# Patient Record
Sex: FEMALE | Race: WHITE | NOT HISPANIC OR LATINO | Employment: UNEMPLOYED | URBAN - METROPOLITAN AREA
[De-identification: names, ages, dates, MRNs, and addresses within clinical notes are randomized per-mention and may not be internally consistent; named-entity substitution may affect disease eponyms.]

---

## 2019-11-11 ENCOUNTER — APPOINTMENT (OUTPATIENT)
Dept: WOMENS IMAGING | Facility: HOSPITAL | Age: 49
End: 2019-11-11

## 2019-11-11 PROCEDURE — 77067 SCR MAMMO BI INCL CAD: CPT | Performed by: RADIOLOGY

## 2021-03-05 ENCOUNTER — APPOINTMENT (OUTPATIENT)
Dept: WOMENS IMAGING | Facility: HOSPITAL | Age: 51
End: 2021-03-05

## 2021-03-05 PROCEDURE — 77067 SCR MAMMO BI INCL CAD: CPT | Performed by: RADIOLOGY

## 2022-08-11 RX ORDER — ESCITALOPRAM OXALATE 10 MG/1
TABLET ORAL
Qty: 90 TABLET | Refills: 0 | Status: SHIPPED | OUTPATIENT
Start: 2022-08-11 | End: 2022-11-16

## 2022-08-11 RX ORDER — MELOXICAM 7.5 MG/1
TABLET ORAL
Qty: 30 TABLET | Refills: 0 | Status: SHIPPED | OUTPATIENT
Start: 2022-08-11 | End: 2022-09-13 | Stop reason: SDUPTHER

## 2022-09-13 RX ORDER — MELOXICAM 7.5 MG/1
7.5 TABLET ORAL DAILY
Qty: 30 TABLET | Refills: 0 | Status: SHIPPED | OUTPATIENT
Start: 2022-09-13 | End: 2022-10-18 | Stop reason: SDUPTHER

## 2022-09-13 NOTE — TELEPHONE ENCOUNTER
Caller: Allison Jim    Relationship: Self    Best call back number: [phone]    Requested Prescriptions:   Requested Prescriptions     Pending Prescriptions Disp Refills   • meloxicam (MOBIC) 7.5 MG tablet 30 tablet 0     Sig: Take 1 tablet by mouth Daily.        Pharmacy where request should be sent:[facility]      Additional details provided by patient: PATIENT HAS 2 PILLS LEFT    Does the patient have less than a 3 day supply:  [x] Yes  [] No    Kathia Adam Rep   09/13/22 09:15 EDT

## 2022-10-18 ENCOUNTER — TELEPHONE (OUTPATIENT)
Dept: FAMILY MEDICINE CLINIC | Facility: CLINIC | Age: 52
End: 2022-10-18

## 2022-10-18 RX ORDER — MELOXICAM 7.5 MG/1
7.5 TABLET ORAL DAILY
Qty: 30 TABLET | Refills: 0 | Status: SHIPPED | OUTPATIENT
Start: 2022-10-18 | End: 2022-10-27

## 2022-10-18 RX ORDER — VALACYCLOVIR HYDROCHLORIDE 1 G/1
1000 TABLET, FILM COATED ORAL AS NEEDED
COMMUNITY
Start: 2022-09-26

## 2022-10-18 RX ORDER — NITROFURANTOIN 25; 75 MG/1; MG/1
CAPSULE ORAL
COMMUNITY
Start: 2022-10-04

## 2022-10-18 RX ORDER — ESTRADIOL 0.1 MG/G
CREAM VAGINAL
COMMUNITY
Start: 2022-08-01

## 2022-10-18 RX ORDER — ATORVASTATIN CALCIUM 20 MG/1
20 TABLET, FILM COATED ORAL DAILY
COMMUNITY
Start: 2022-10-04 | End: 2023-01-12

## 2022-10-18 NOTE — TELEPHONE ENCOUNTER
Caller: Max Jima    Relationship: Self    Best call back number:     [phone]       Requested Prescriptions:   Requested Prescriptions     Pending Prescriptions Disp Refills   • meloxicam (MOBIC) 7.5 MG tablet 30 tablet 0     Sig: Take 1 tablet by mouth Daily.        Pharmacy where request should be sent: [facility] [address] - [phone] [facility] [phone] [facility]     Additional details provided by patient:  PATIENT IS OUT OF THE MEDICATION     Does the patient have less than a 3 day supply:  [x] Yes  [] No

## 2022-10-27 ENCOUNTER — OFFICE VISIT (OUTPATIENT)
Dept: FAMILY MEDICINE CLINIC | Facility: CLINIC | Age: 52
End: 2022-10-27

## 2022-10-27 VITALS
HEIGHT: 61 IN | WEIGHT: 122.5 LBS | OXYGEN SATURATION: 97 % | DIASTOLIC BLOOD PRESSURE: 82 MMHG | SYSTOLIC BLOOD PRESSURE: 114 MMHG | BODY MASS INDEX: 23.13 KG/M2 | HEART RATE: 75 BPM

## 2022-10-27 DIAGNOSIS — Z79.899 ENCOUNTER FOR LONG-TERM (CURRENT) USE OF OTHER MEDICATIONS: ICD-10-CM

## 2022-10-27 DIAGNOSIS — Z23 NEEDS FLU SHOT: ICD-10-CM

## 2022-10-27 DIAGNOSIS — H93.8X3 EAR FULLNESS, BILATERAL: ICD-10-CM

## 2022-10-27 DIAGNOSIS — J30.2 SEASONAL ALLERGIC RHINITIS, UNSPECIFIED TRIGGER: ICD-10-CM

## 2022-10-27 DIAGNOSIS — M25.50 ARTHRALGIA, UNSPECIFIED JOINT: Primary | ICD-10-CM

## 2022-10-27 PROBLEM — J30.9 ALLERGIC RHINITIS: Status: ACTIVE | Noted: 2022-10-27

## 2022-10-27 PROCEDURE — 99214 OFFICE O/P EST MOD 30 MIN: CPT | Performed by: NURSE PRACTITIONER

## 2022-10-27 PROCEDURE — 90471 IMMUNIZATION ADMIN: CPT | Performed by: NURSE PRACTITIONER

## 2022-10-27 PROCEDURE — 90686 IIV4 VACC NO PRSV 0.5 ML IM: CPT | Performed by: NURSE PRACTITIONER

## 2022-10-27 RX ORDER — PREDNISONE 20 MG/1
TABLET ORAL
Qty: 18 TABLET | Refills: 0 | Status: SHIPPED | OUTPATIENT
Start: 2022-10-27 | End: 2022-11-05

## 2022-10-27 RX ORDER — MELOXICAM 15 MG/1
15 TABLET ORAL DAILY
Qty: 90 TABLET | Refills: 1 | Status: SHIPPED | OUTPATIENT
Start: 2022-10-27

## 2022-10-27 NOTE — ASSESSMENT & PLAN NOTE
Flu shot given today.  Vaccine information sheet given.  Risk discussed understood.  Patient tolerated well.  Return to clinic or ED with any issues or concerns.

## 2022-10-27 NOTE — ASSESSMENT & PLAN NOTE
Continue with allergy meds and Flonase.  Prednisone should help with this.  Avoid NSAIDs while taking prednisone.  She knows not to take meloxicam while on prednisone.  Informed her that allergies are likely the cause of her ear fullness.  Bilateral tympanic membranes are bulging so hoping the prednisone will help this also.  Risk discussed understood.  Return in 1 week if no improvement, sooner if worsens.  Return to clinic or ED with any issues or concerns.

## 2022-10-27 NOTE — PROGRESS NOTES
"Chief Complaint  Med Refill    Subjective          Allison Jim presents to [facility]  History of Present Illness     Presents for refill of meloxicam which she takes for joint aches and pains.  States it is working well but feels she would benefit from an increased dose.  No redness no warmth no swelling.    States she also has allergies and takes an allergy pill and Flonase daily.  States for the past year she has been dealing with bilateral ear fullness and pressure.  States the Flonase and allergy medicine does not seem to be helping it.  No pain no discharge.  No fever no chills no body aches.    States she has a follow-up with her gynecologist Dr. Perez in December and states they keep up with Pap smears and mammograms.  Colonoscopy up-to-date.  She would like a flu shot today.  States she will discuss further immunizations such as pneumonia tetanus and COVID vaccines with her pharmacist.    Objective   Vital Signs:   /82   Pulse 75   Ht 154.9 cm (61\")   Wt 55.6 kg (122 lb 8 oz)   SpO2 97%   BMI 23.15 kg/m²     Body mass index is 23.15 kg/m².    Review of Systems   Constitutional: Negative for chills, fatigue and fever.   HENT: Positive for postnasal drip and rhinorrhea. Negative for congestion, ear discharge, sinus pressure, sneezing, sore throat and trouble swallowing.    Respiratory: Negative for cough, shortness of breath and wheezing.    Cardiovascular: Negative for chest pain and palpitations.   Gastrointestinal: Negative for abdominal pain, blood in stool, constipation, diarrhea, nausea and vomiting.   Genitourinary: Negative for dysuria.   Musculoskeletal: Positive for arthralgias.   Neurological: Negative for dizziness and headache.   Psychiatric/Behavioral: Negative for suicidal ideas.       Past History:  Medical History: has no past medical history on file.   Surgical History: has a past surgical history that includes Tubal ligation.   Family History: " family history includes Cancer in her father; Diabetes in her mother; Hypertension in her mother.   Social History: reports that she quit smoking about 3 years ago. Her smoking use included cigarettes. She has a 7.50 pack-year smoking history. She has never used smokeless tobacco.    PHQ-2 Depression Screening  Little interest or pleasure in doing things? 0-->not at all   Feeling down, depressed, or hopeless? 0-->not at all   PHQ-2 Total Score 0        PHQ-9 Depression Screening  Little interest or pleasure in doing things? 0-->not at all   Feeling down, depressed, or hopeless? 0-->not at all   Trouble falling or staying asleep, or sleeping too much?     Feeling tired or having little energy?     Poor appetite or overeating?     Feeling bad about yourself - or that you are a failure or have let yourself or your family down?     Trouble concentrating on things, such as reading the newspaper or watching television?     Moving or speaking so slowly that other people could have noticed? Or the opposite - being so fidgety or restless that you have been moving around a lot more than usual?     Thoughts that you would be better off dead, or of hurting yourself in some way?     PHQ-9 Total Score 0   If you checked off any problems, how difficult have these problems made it for you to do your work, take care of things at home, or get along with other people?       PHQ-9 Total Score: 0      Patient screened positive for depression based on a PHQ-9 score of 0 on 10/27/2022. Follow-up recommendations include:         Current Outpatient Medications:   •  atorvastatin (LIPITOR) 20 MG tablet, Take 1 tablet by mouth Daily., Disp: , Rfl:   •  escitalopram (LEXAPRO) 10 MG tablet, TAKE 1 TABLET BY MOUTH EVERY DAY, Disp: 90 tablet, Rfl: 0  •  estradiol (ESTRACE) 0.1 MG/GM vaginal cream, , Disp: , Rfl:   •  nitrofurantoin, macrocrystal-monohydrate, (MACROBID) 100 MG capsule, , Disp: , Rfl:   •  valACYclovir (VALTREX) 1000 MG tablet,  Take 1 tablet by mouth As Needed., Disp: , Rfl:   •  meloxicam (MOBIC) 15 MG tablet, Take 1 tablet by mouth Daily. DO NOT TAKE WHILE TAKING PREDNISONE, Disp: 90 tablet, Rfl: 1  •  predniSONE (DELTASONE) 20 MG tablet, Take 3 tablets by mouth Daily for 3 days, THEN 2 tablets Daily for 3 days, THEN 1 tablet Daily for 3 days., Disp: 18 tablet, Rfl: 0   (Not in a hospital admission)     Allergies: Patient has no known allergies.    Physical Exam  Constitutional:       Appearance: Normal appearance.   HENT:      Right Ear: Ear canal and external ear normal.      Left Ear: Ear canal and external ear normal.      Ears:      Comments: Bilateral bulging tympanic membranes.  No redness.     Nose: Rhinorrhea present.      Mouth/Throat:      Mouth: Mucous membranes are moist.      Pharynx: Oropharynx is clear.   Eyes:      Conjunctiva/sclera: Conjunctivae normal.      Pupils: Pupils are equal, round, and reactive to light.   Cardiovascular:      Rate and Rhythm: Normal rate and regular rhythm.      Heart sounds: Normal heart sounds.   Pulmonary:      Effort: Pulmonary effort is normal.      Breath sounds: Normal breath sounds.   Skin:     General: Skin is warm.      Findings: No erythema.   Neurological:      General: No focal deficit present.      Mental Status: She is alert and oriented to person, place, and time. Mental status is at baseline.   Psychiatric:         Mood and Affect: Mood normal.         Behavior: Behavior normal.         Thought Content: Thought content normal.         Judgment: Judgment normal.          Result Review :                   Assessment and Plan    Diagnoses and all orders for this visit:    1. Arthralgia, unspecified joint (Primary)  Assessment & Plan:  Labs drawn.  Will increase meloxicam.  She knows not to take the meloxicam while she is taking the prednisone.  Risk discussed understood.  Return to clinic or ED with any issues or concerns.    Orders:  -     meloxicam (MOBIC) 15 MG tablet; Take 1  tablet by mouth Daily. DO NOT TAKE WHILE TAKING PREDNISONE  Dispense: 90 tablet; Refill: 1    2. Seasonal allergic rhinitis, unspecified trigger  Assessment & Plan:  Continue with allergy meds and Flonase.  Prednisone should help with this.  Avoid NSAIDs while taking prednisone.  She knows not to take meloxicam while on prednisone.  Informed her that allergies are likely the cause of her ear fullness.  Bilateral tympanic membranes are bulging so hoping the prednisone will help this also.  Risk discussed understood.  Return in 1 week if no improvement, sooner if worsens.  Return to clinic or ED with any issues or concerns.    Orders:  -     predniSONE (DELTASONE) 20 MG tablet; Take 3 tablets by mouth Daily for 3 days, THEN 2 tablets Daily for 3 days, THEN 1 tablet Daily for 3 days.  Dispense: 18 tablet; Refill: 0    3. Ear fullness, bilateral  -     predniSONE (DELTASONE) 20 MG tablet; Take 3 tablets by mouth Daily for 3 days, THEN 2 tablets Daily for 3 days, THEN 1 tablet Daily for 3 days.  Dispense: 18 tablet; Refill: 0    4. Encounter for long-term (current) use of other medications  -     CBC & Differential; Future  -     Comprehensive Metabolic Panel; Future  -     CBC & Differential  -     Comprehensive Metabolic Panel    5. Needs flu shot  Assessment & Plan:  Flu shot given today.  Vaccine information sheet given.  Risk discussed understood.  Patient tolerated well.  Return to clinic or ED with any issues or concerns.              BMI is within normal parameters. No other follow-up for BMI required.       Follow Up   Return in about 6 months (around 4/27/2023).  Patient was given instructions and counseling regarding her condition or for health maintenance advice. Please see specific information pulled into the AVS if appropriate.     YOMI Oneal

## 2022-10-27 NOTE — ASSESSMENT & PLAN NOTE
Labs drawn.  Will increase meloxicam.  She knows not to take the meloxicam while she is taking the prednisone.  Risk discussed understood.  Return to clinic or ED with any issues or concerns.

## 2022-10-28 LAB
ALBUMIN SERPL-MCNC: 4.5 G/DL (ref 3.8–4.9)
ALBUMIN/GLOB SERPL: 1.9 {RATIO} (ref 1.2–2.2)
ALP SERPL-CCNC: 106 IU/L (ref 44–121)
ALT SERPL-CCNC: 18 IU/L (ref 0–32)
AST SERPL-CCNC: 23 IU/L (ref 0–40)
BASOPHILS # BLD AUTO: 0.1 X10E3/UL (ref 0–0.2)
BASOPHILS NFR BLD AUTO: 1 %
BILIRUB SERPL-MCNC: 0.3 MG/DL (ref 0–1.2)
BUN SERPL-MCNC: 8 MG/DL (ref 6–24)
BUN/CREAT SERPL: 11 (ref 9–23)
CALCIUM SERPL-MCNC: 9.9 MG/DL (ref 8.7–10.2)
CHLORIDE SERPL-SCNC: 105 MMOL/L (ref 96–106)
CO2 SERPL-SCNC: 22 MMOL/L (ref 20–29)
CREAT SERPL-MCNC: 0.75 MG/DL (ref 0.57–1)
EGFRCR SERPLBLD CKD-EPI 2021: 95 ML/MIN/1.73
EOSINOPHIL # BLD AUTO: 0.3 X10E3/UL (ref 0–0.4)
EOSINOPHIL NFR BLD AUTO: 4 %
ERYTHROCYTE [DISTWIDTH] IN BLOOD BY AUTOMATED COUNT: 12.6 % (ref 11.7–15.4)
GLOBULIN SER CALC-MCNC: 2.4 G/DL (ref 1.5–4.5)
GLUCOSE SERPL-MCNC: 109 MG/DL (ref 70–99)
HCT VFR BLD AUTO: 37.7 % (ref 34–46.6)
HGB BLD-MCNC: 12.5 G/DL (ref 11.1–15.9)
IMM GRANULOCYTES # BLD AUTO: 0 X10E3/UL (ref 0–0.1)
IMM GRANULOCYTES NFR BLD AUTO: 0 %
LYMPHOCYTES # BLD AUTO: 2.5 X10E3/UL (ref 0.7–3.1)
LYMPHOCYTES NFR BLD AUTO: 36 %
MCH RBC QN AUTO: 30.9 PG (ref 26.6–33)
MCHC RBC AUTO-ENTMCNC: 33.2 G/DL (ref 31.5–35.7)
MCV RBC AUTO: 93 FL (ref 79–97)
MONOCYTES # BLD AUTO: 0.5 X10E3/UL (ref 0.1–0.9)
MONOCYTES NFR BLD AUTO: 6 %
NEUTROPHILS # BLD AUTO: 3.8 X10E3/UL (ref 1.4–7)
NEUTROPHILS NFR BLD AUTO: 53 %
PLATELET # BLD AUTO: 243 X10E3/UL (ref 150–450)
POTASSIUM SERPL-SCNC: 4.3 MMOL/L (ref 3.5–5.2)
PROT SERPL-MCNC: 6.9 G/DL (ref 6–8.5)
RBC # BLD AUTO: 4.04 X10E6/UL (ref 3.77–5.28)
SODIUM SERPL-SCNC: 142 MMOL/L (ref 134–144)
WBC # BLD AUTO: 7.2 X10E3/UL (ref 3.4–10.8)

## 2022-11-16 ENCOUNTER — PATIENT MESSAGE (OUTPATIENT)
Dept: FAMILY MEDICINE CLINIC | Facility: CLINIC | Age: 52
End: 2022-11-16

## 2022-11-16 RX ORDER — ESCITALOPRAM OXALATE 20 MG/1
20 TABLET ORAL DAILY
Qty: 90 TABLET | Refills: 0 | Status: SHIPPED | OUTPATIENT
Start: 2022-11-16 | End: 2023-02-10

## 2023-01-12 RX ORDER — ATORVASTATIN CALCIUM 20 MG/1
TABLET, FILM COATED ORAL
Qty: 90 TABLET | Refills: 0 | Status: SHIPPED | OUTPATIENT
Start: 2023-01-12

## 2023-02-10 RX ORDER — ESCITALOPRAM OXALATE 20 MG/1
TABLET ORAL
Qty: 90 TABLET | Refills: 0 | Status: SHIPPED | OUTPATIENT
Start: 2023-02-10

## 2023-02-10 NOTE — TELEPHONE ENCOUNTER
Last seen 10/27/22 do you want to schedule MRC for April and send enough until then for 6 month check?

## 2023-04-27 RX ORDER — ATORVASTATIN CALCIUM 20 MG/1
TABLET, FILM COATED ORAL
Qty: 30 TABLET | Refills: 0 | Status: SHIPPED | OUTPATIENT
Start: 2023-04-27

## 2023-05-04 ENCOUNTER — TELEPHONE (OUTPATIENT)
Dept: FAMILY MEDICINE CLINIC | Facility: CLINIC | Age: 53
End: 2023-05-04

## 2023-05-04 ENCOUNTER — OFFICE VISIT (OUTPATIENT)
Dept: FAMILY MEDICINE CLINIC | Facility: CLINIC | Age: 53
End: 2023-05-04
Payer: COMMERCIAL

## 2023-05-04 VITALS
OXYGEN SATURATION: 97 % | HEIGHT: 61 IN | HEART RATE: 68 BPM | SYSTOLIC BLOOD PRESSURE: 110 MMHG | DIASTOLIC BLOOD PRESSURE: 68 MMHG | BODY MASS INDEX: 23.88 KG/M2 | WEIGHT: 126.5 LBS

## 2023-05-04 DIAGNOSIS — F41.9 ANXIETY: ICD-10-CM

## 2023-05-04 DIAGNOSIS — J30.2 SEASONAL ALLERGIC RHINITIS, UNSPECIFIED TRIGGER: ICD-10-CM

## 2023-05-04 DIAGNOSIS — R53.83 OTHER FATIGUE: ICD-10-CM

## 2023-05-04 DIAGNOSIS — M25.50 ARTHRALGIA, UNSPECIFIED JOINT: ICD-10-CM

## 2023-05-04 DIAGNOSIS — Z23 IMMUNIZATION DUE: ICD-10-CM

## 2023-05-04 DIAGNOSIS — Z12.31 ENCOUNTER FOR SCREENING MAMMOGRAM FOR MALIGNANT NEOPLASM OF BREAST: ICD-10-CM

## 2023-05-04 DIAGNOSIS — Z79.899 ENCOUNTER FOR LONG-TERM (CURRENT) USE OF OTHER MEDICATIONS: ICD-10-CM

## 2023-05-04 DIAGNOSIS — R73.03 PREDIABETES: ICD-10-CM

## 2023-05-04 DIAGNOSIS — Z12.11 SCREENING FOR COLON CANCER: ICD-10-CM

## 2023-05-04 DIAGNOSIS — R06.83 SNORES: ICD-10-CM

## 2023-05-04 DIAGNOSIS — Z00.00 ANNUAL PHYSICAL EXAM: Primary | ICD-10-CM

## 2023-05-04 DIAGNOSIS — Z11.59 NEED FOR HEPATITIS C SCREENING TEST: ICD-10-CM

## 2023-05-04 DIAGNOSIS — E78.2 MIXED HYPERLIPIDEMIA: ICD-10-CM

## 2023-05-04 DIAGNOSIS — Z12.4 SCREENING FOR CERVICAL CANCER: ICD-10-CM

## 2023-05-04 PROBLEM — Z12.39 SCREENING FOR BREAST CANCER: Status: ACTIVE | Noted: 2023-05-04

## 2023-05-04 LAB
BILIRUB BLD-MCNC: NEGATIVE MG/DL
CLARITY, POC: CLEAR
COLOR UR: YELLOW
EXPIRATION DATE: NORMAL
GLUCOSE UR STRIP-MCNC: NEGATIVE MG/DL
KETONES UR QL: NEGATIVE
LEUKOCYTE EST, POC: NEGATIVE
Lab: NORMAL
NITRITE UR-MCNC: NEGATIVE MG/ML
PH UR: 6 [PH] (ref 5–8)
PROT UR STRIP-MCNC: NEGATIVE MG/DL
RBC # UR STRIP: NEGATIVE /UL
SP GR UR: 1.02 (ref 1–1.03)
UROBILINOGEN UR QL: NORMAL

## 2023-05-04 RX ORDER — CELECOXIB 100 MG/1
100 CAPSULE ORAL 2 TIMES DAILY PRN
Qty: 60 CAPSULE | Refills: 2 | Status: SHIPPED | OUTPATIENT
Start: 2023-05-04

## 2023-05-04 RX ORDER — ESCITALOPRAM OXALATE 20 MG/1
20 TABLET ORAL DAILY
Qty: 90 TABLET | Refills: 1 | Status: SHIPPED | OUTPATIENT
Start: 2023-05-04

## 2023-05-04 RX ORDER — ATORVASTATIN CALCIUM 20 MG/1
20 TABLET, FILM COATED ORAL DAILY
Qty: 90 TABLET | Refills: 1 | Status: SHIPPED | OUTPATIENT
Start: 2023-05-04

## 2023-05-04 NOTE — ASSESSMENT & PLAN NOTE
Stop meloxicam and we will switch to Celebrex.  Risk discussed and understood.  Education provided.  She will let me know in a couple of weeks if not helping, sooner if worsens.  Return to clinic or ED with any issues or concerns.

## 2023-05-04 NOTE — ASSESSMENT & PLAN NOTE
Fasting labs drawn.  UA completed.  Meds refilled.  Goal blood pressure less than 140/90.  Let me know if gets to or above that.  PHQ-9 completed and discussed.  No thoughts of suicide or hurting herself or anyone else.  Risk of meds discussed and understood.  Annual dental and eye exams encouraged.  Patient states she is up-to-date on mammogram so I will try to get that records from [facility].  She will continue to follow-up with her gynecologist Dr. Perez who she states keeps up with her Pap smears.  Colonoscopy up-to-date.  Tdap given today in clinic.  Patient states she will discuss shingles vaccine with her pharmacist.  Patient denies pneumonia and COVID-vaccine.  Education provided.  Return to clinic or ED with any issues or concerns.

## 2023-05-04 NOTE — TELEPHONE ENCOUNTER
Patient states she had a mammogram at [facility] within the past 1 to 2 months.  Please get that record and get it scanned in and also put into the care gaps.  Thanks

## 2023-05-04 NOTE — ASSESSMENT & PLAN NOTE
Tdap given today in clinic.  Vaccine information sheet given.  Risk discussed understood.  Education provided.  Return to clinic or ED with any issues or concerns.

## 2023-05-04 NOTE — ASSESSMENT & PLAN NOTE
Labs drawn.  We will order sleep study.  Informed patient to call afterwards for results.  Stay hydrated with water.  Proper diet and exercise plan discussed and encouraged.  Proper sleep hygiene discussed and encouraged.  Return to clinic or ED with any issues or concerns.

## 2023-05-04 NOTE — PROGRESS NOTES
Chief Complaint  Fatigue and Hyperlipidemia    Sav Jim presents to [facility] for preventative yearly exam.   History of Present Illness     Patient presents for annual exam.  She is fasting.  Quit smoking 4 to 5 years ago.  No alcohol use.  Doing well on medications.  Anxiety well-controlled on Lexapro.  Hyperlipidemia well-controlled on atorvastatin.  She does have a history of arthritic pains in her hands and knees.  She is on meloxicam but would like to switch to Celebrex to see if that helps more.  No redness no warmth no swelling.  Tries to eat a healthy well-balanced diet and she does stay active.  She states for a while she has felt fatigued and rundown.  She states she has a busy life that she watches over her 2 grandkids that are 8/9 years old.  States they are busy with baseball so is unsure if it is due to life.  States she gets 6+ hours of sleep at night but states she will wake up and still not feel rested.  She states she is a heavy snorer though and has never had a sleep study.  No dizziness no headache no chest pain no chest pressure no shortness of breath no trouble breathing no urinary or bowel issues.    She states she is up-to-date on mammograms stating she had one 1 month ago at [facility].  She states she has an appointment coming up with her gynecologist Dr. Perez in the near future who she states keeps up to date on her Pap smears.  Colonoscopy completed 2019 and is good for 10 years.  She states she is up-to-date on flu vaccines.  She denies further COVID vaccines.  Denies pneumonia vaccine.  States she will discuss shingles vaccine with her pharmacist.  States it has been over 10 years since her last tetanus vaccine so she would like to get the Tdap today.  States she has done fine in the past with all immunizations with no issues or side effects.    Objective   Vital Signs:   /68   Pulse 68   Ht 154.9 cm  "(61\")   Wt 57.4 kg (126 lb 8 oz)   SpO2 97%   BMI 23.90 kg/m²     Body mass index is 23.9 kg/m².    Predictive Model Details   No score data available for Risk of Fall        PHQ-9 Depression Screening  Little interest or pleasure in doing things? 0-->not at all   Feeling down, depressed, or hopeless? 0-->not at all   Trouble falling or staying asleep, or sleeping too much?     Feeling tired or having little energy?     Poor appetite or overeating?     Feeling bad about yourself - or that you are a failure or have let yourself or your family down?     Trouble concentrating on things, such as reading the newspaper or watching television?     Moving or speaking so slowly that other people could have noticed? Or the opposite - being so fidgety or restless that you have been moving around a lot more than usual?     Thoughts that you would be better off dead, or of hurting yourself in some way?     PHQ-9 Total Score 0   If you checked off any problems, how difficult have these problems made it for you to do your work, take care of things at home, or get along with other people?       Patient screened positive for depression based on a PHQ-9 score of 0 on 5/4/2023. Follow-up recommendations include:        Immunization History   Administered Date(s) Administered   • COVID-19 (BERRY) 04/06/2021   • FluLaval/Fluzone >6mos 10/27/2022   • Influenza, Unspecified 12/17/2020   • Tdap 05/04/2023       Review of Systems   Constitutional: Positive for fatigue. Negative for chills, fever, unexpected weight gain and unexpected weight loss.   HENT: Negative for congestion.    Eyes: Negative.    Respiratory: Negative.    Cardiovascular: Negative.    Gastrointestinal: Negative.    Genitourinary: Negative.    Musculoskeletal: Positive for arthralgias. Negative for back pain, joint swelling, myalgias and neck pain.   Skin: Negative.    Neurological: Negative.    Psychiatric/Behavioral: Negative.        Past History:  Medical History: " has no past medical history on file.   Surgical History: has a past surgical history that includes Tubal ligation.   Family History: family history includes Cancer in her father; Diabetes in her mother; Hypertension in her mother.   Social History: reports that she quit smoking about 4 years ago. Her smoking use included cigarettes. She has a 7.50 pack-year smoking history. She has never used smokeless tobacco.      Current Outpatient Medications:   •  atorvastatin (LIPITOR) 20 MG tablet, Take 1 tablet by mouth Daily., Disp: 90 tablet, Rfl: 1  •  escitalopram (LEXAPRO) 20 MG tablet, Take 1 tablet by mouth Daily., Disp: 90 tablet, Rfl: 1  •  valACYclovir (VALTREX) 1000 MG tablet, Take 1 tablet by mouth As Needed., Disp: , Rfl:   •  celecoxib (CeleBREX) 100 MG capsule, Take 1 capsule by mouth 2 (Two) Times a Day As Needed for Mild Pain., Disp: 60 capsule, Rfl: 2   Allergies: Patient has no known allergies.    Physical Exam  Constitutional:       Appearance: Normal appearance. She is normal weight.   HENT:      Head: Normocephalic.      Right Ear: Tympanic membrane, ear canal and external ear normal.      Left Ear: Tympanic membrane, ear canal and external ear normal.      Nose: Nose normal.      Mouth/Throat:      Mouth: Mucous membranes are moist.      Pharynx: Oropharynx is clear.   Eyes:      Extraocular Movements: Extraocular movements intact.      Conjunctiva/sclera: Conjunctivae normal.      Pupils: Pupils are equal, round, and reactive to light.   Cardiovascular:      Rate and Rhythm: Normal rate and regular rhythm.      Heart sounds: Normal heart sounds.   Pulmonary:      Effort: Pulmonary effort is normal.      Breath sounds: Normal breath sounds.   Abdominal:      General: Abdomen is flat. Bowel sounds are normal.      Palpations: Abdomen is soft.   Musculoskeletal:         General: Normal range of motion.      Cervical back: Normal range of motion.   Skin:     General: Skin is warm.      Findings: No  erythema or rash.   Neurological:      General: No focal deficit present.      Mental Status: She is alert and oriented to person, place, and time. Mental status is at baseline.   Psychiatric:         Attention and Perception: Attention and perception normal.         Mood and Affect: Mood and affect normal.         Speech: Speech normal.         Behavior: Behavior normal. Behavior is cooperative.         Thought Content: Thought content normal. Thought content does not include homicidal or suicidal ideation. Thought content does not include homicidal or suicidal plan.         Cognition and Memory: Cognition and memory normal.         Judgment: Judgment normal.          Result Review :                     Assessment and Plan    Diagnoses and all orders for this visit:    1. Annual physical exam (Primary)  Assessment & Plan:  Fasting labs drawn.  UA completed.  Meds refilled.  Goal blood pressure less than 140/90.  Let me know if gets to or above that.  PHQ-9 completed and discussed.  No thoughts of suicide or hurting herself or anyone else.  Risk of meds discussed and understood.  Annual dental and eye exams encouraged.  Patient states she is up-to-date on mammogram so I will try to get that records from [facility].  She will continue to follow-up with her gynecologist Dr. Perez who she states keeps up with her Pap smears.  Colonoscopy up-to-date.  Tdap given today in clinic.  Patient states she will discuss shingles vaccine with her pharmacist.  Patient denies pneumonia and COVID-vaccine.  Education provided.  Return to clinic or ED with any issues or concerns.    Orders:  -     CBC & Differential; Future  -     Comprehensive Metabolic Panel; Future  -     TSH Rfx On Abnormal To Free T4; Future  -     POC Urinalysis Dipstick, Automated; Future  -     CBC & Differential  -     Comprehensive Metabolic Panel  -     TSH Rfx On Abnormal To Free T4  -     POC Urinalysis Dipstick, Automated    2. Seasonal allergic  rhinitis, unspecified trigger    3. Screening for colon cancer    4. Encounter for screening mammogram for malignant neoplasm of breast    5. Screening for cervical cancer    6. Encounter for long-term (current) use of other medications    7. Immunization due  Assessment & Plan:  Tdap given today in clinic.  Vaccine information sheet given.  Risk discussed understood.  Education provided.  Return to clinic or ED with any issues or concerns.    Orders:  -     Tdap Vaccine Greater Than or Equal To 6yo IM    8. Anxiety  -     escitalopram (LEXAPRO) 20 MG tablet; Take 1 tablet by mouth Daily.  Dispense: 90 tablet; Refill: 1    9. Arthralgia, unspecified joint  Assessment & Plan:  Stop meloxicam and we will switch to Celebrex.  Risk discussed and understood.  Education provided.  She will let me know in a couple of weeks if not helping, sooner if worsens.  Return to clinic or ED with any issues or concerns.    Orders:  -     celecoxib (CeleBREX) 100 MG capsule; Take 1 capsule by mouth 2 (Two) Times a Day As Needed for Mild Pain.  Dispense: 60 capsule; Refill: 2    10. Snores  -     Home Sleep Study; Future    11. Other fatigue  Assessment & Plan:  Labs drawn.  We will order sleep study.  Informed patient to call afterwards for results.  Stay hydrated with water.  Proper diet and exercise plan discussed and encouraged.  Proper sleep hygiene discussed and encouraged.  Return to clinic or ED with any issues or concerns.    Orders:  -     Home Sleep Study; Future  -     CBC & Differential; Future  -     Comprehensive Metabolic Panel; Future  -     TSH Rfx On Abnormal To Free T4; Future  -     Iron; Future  -     Ferritin; Future  -     Folate; Future  -     Vitamin D,25-Hydroxy; Future  -     Vitamin B12; Future  -     POC Urinalysis Dipstick, Automated; Future  -     CBC & Differential  -     Comprehensive Metabolic Panel  -     TSH Rfx On Abnormal To Free T4  -     Iron  -     Ferritin  -     Folate  -     Vitamin  D,25-Hydroxy  -     Vitamin B12  -     POC Urinalysis Dipstick, Automated    12. Need for hepatitis C screening test  -     Hepatitis C antibody; Future  -     Hepatitis C antibody    13. Prediabetes  -     Hemoglobin A1c; Future  -     Hemoglobin A1c    14. Mixed hyperlipidemia  -     Lipid Panel; Future  -     Lipid Panel  -     atorvastatin (LIPITOR) 20 MG tablet; Take 1 tablet by mouth Daily.  Dispense: 90 tablet; Refill: 1            BMI is within normal parameters. No other follow-up for BMI required.       Follow Up   Return in about 6 months (around 11/4/2023), or if symptoms worsen or fail to improve.  Patient was given instructions and counseling regarding her condition or for health maintenance advice. Please see specific information pulled into the AVS if appropriate.     Vinod Alston APRN

## 2023-05-05 LAB
25(OH)D3+25(OH)D2 SERPL-MCNC: 37.2 NG/ML (ref 30–100)
ALBUMIN SERPL-MCNC: 4.7 G/DL (ref 3.8–4.9)
ALBUMIN/GLOB SERPL: 1.7 {RATIO} (ref 1.2–2.2)
ALP SERPL-CCNC: 105 IU/L (ref 44–121)
ALT SERPL-CCNC: 28 IU/L (ref 0–32)
AST SERPL-CCNC: 28 IU/L (ref 0–40)
BASOPHILS # BLD AUTO: 0.1 X10E3/UL (ref 0–0.2)
BASOPHILS NFR BLD AUTO: 1 %
BILIRUB SERPL-MCNC: 0.4 MG/DL (ref 0–1.2)
BUN SERPL-MCNC: 10 MG/DL (ref 6–24)
BUN/CREAT SERPL: 14 (ref 9–23)
CALCIUM SERPL-MCNC: 9.4 MG/DL (ref 8.7–10.2)
CHLORIDE SERPL-SCNC: 105 MMOL/L (ref 96–106)
CHOLEST SERPL-MCNC: 191 MG/DL (ref 100–199)
CO2 SERPL-SCNC: 24 MMOL/L (ref 20–29)
CREAT SERPL-MCNC: 0.72 MG/DL (ref 0.57–1)
EGFRCR SERPLBLD CKD-EPI 2021: 99 ML/MIN/1.73
EOSINOPHIL # BLD AUTO: 0.3 X10E3/UL (ref 0–0.4)
EOSINOPHIL NFR BLD AUTO: 4 %
ERYTHROCYTE [DISTWIDTH] IN BLOOD BY AUTOMATED COUNT: 12.7 % (ref 11.7–15.4)
FERRITIN SERPL-MCNC: 85 NG/ML (ref 15–150)
FOLATE SERPL-MCNC: 12.3 NG/ML
GLOBULIN SER CALC-MCNC: 2.7 G/DL (ref 1.5–4.5)
GLUCOSE SERPL-MCNC: 92 MG/DL (ref 70–99)
HBA1C MFR BLD: 5.8 % (ref 4.8–5.6)
HCT VFR BLD AUTO: 37.3 % (ref 34–46.6)
HDLC SERPL-MCNC: 38 MG/DL
HGB BLD-MCNC: 12.7 G/DL (ref 11.1–15.9)
IMM GRANULOCYTES # BLD AUTO: 0 X10E3/UL (ref 0–0.1)
IMM GRANULOCYTES NFR BLD AUTO: 0 %
IRON SERPL-MCNC: 70 UG/DL (ref 27–159)
LDLC SERPL CALC-MCNC: 111 MG/DL (ref 0–99)
LYMPHOCYTES # BLD AUTO: 2.9 X10E3/UL (ref 0.7–3.1)
LYMPHOCYTES NFR BLD AUTO: 42 %
MCH RBC QN AUTO: 31.8 PG (ref 26.6–33)
MCHC RBC AUTO-ENTMCNC: 34 G/DL (ref 31.5–35.7)
MCV RBC AUTO: 94 FL (ref 79–97)
MONOCYTES # BLD AUTO: 0.5 X10E3/UL (ref 0.1–0.9)
MONOCYTES NFR BLD AUTO: 7 %
NEUTROPHILS # BLD AUTO: 3.2 X10E3/UL (ref 1.4–7)
NEUTROPHILS NFR BLD AUTO: 46 %
PLATELET # BLD AUTO: 214 X10E3/UL (ref 150–450)
POTASSIUM SERPL-SCNC: 4.9 MMOL/L (ref 3.5–5.2)
PROT SERPL-MCNC: 7.4 G/DL (ref 6–8.5)
RBC # BLD AUTO: 3.99 X10E6/UL (ref 3.77–5.28)
SODIUM SERPL-SCNC: 143 MMOL/L (ref 134–144)
TRIGL SERPL-MCNC: 244 MG/DL (ref 0–149)
TSH SERPL DL<=0.005 MIU/L-ACNC: 1.45 UIU/ML (ref 0.45–4.5)
VIT B12 SERPL-MCNC: 676 PG/ML (ref 232–1245)
VLDLC SERPL CALC-MCNC: 42 MG/DL (ref 5–40)
WBC # BLD AUTO: 7 X10E3/UL (ref 3.4–10.8)

## 2023-05-31 ENCOUNTER — TELEPHONE (OUTPATIENT)
Dept: FAMILY MEDICINE CLINIC | Facility: CLINIC | Age: 53
End: 2023-05-31

## 2023-05-31 NOTE — TELEPHONE ENCOUNTER
Caller: Allison Jim    Relationship: Self    Best call back number:   [phone]         What test was performed:SLEEP STUDY  When was the test performed: 05.23.23    Where was the test performed: AT HOME [facility]  Additional notes: CALL WITH RESULTS

## 2023-08-22 ENCOUNTER — OFFICE VISIT (OUTPATIENT)
Dept: FAMILY MEDICINE CLINIC | Facility: CLINIC | Age: 53
End: 2023-08-22
Payer: COMMERCIAL

## 2023-08-22 VITALS
HEIGHT: 61 IN | SYSTOLIC BLOOD PRESSURE: 122 MMHG | BODY MASS INDEX: 24.07 KG/M2 | HEART RATE: 83 BPM | WEIGHT: 127.5 LBS | DIASTOLIC BLOOD PRESSURE: 86 MMHG | OXYGEN SATURATION: 99 %

## 2023-08-22 DIAGNOSIS — G89.29 CHRONIC PAIN OF RIGHT KNEE: Primary | ICD-10-CM

## 2023-08-22 DIAGNOSIS — M25.561 CHRONIC PAIN OF RIGHT KNEE: Primary | ICD-10-CM

## 2023-08-22 PROCEDURE — 99213 OFFICE O/P EST LOW 20 MIN: CPT | Performed by: NURSE PRACTITIONER

## 2023-08-22 RX ORDER — PREDNISONE 20 MG/1
TABLET ORAL
Qty: 12 TABLET | Refills: 0 | Status: SHIPPED | OUTPATIENT
Start: 2023-08-22

## 2023-08-22 NOTE — ASSESSMENT & PLAN NOTE
X-ray completed.  Will let know if radiologist sees anything.  RICE encouraged.  We will try a course of prednisone.  Avoid NSAIDs including her meloxicam while she is taking the prednisone.  Risk of meds discussed understood.  Education provided.  Turn in 1 week if no improvement, sooner if worsens.  Return to clinic or ED with any issues or concerns.

## 2023-08-22 NOTE — PROGRESS NOTES
"Chief Complaint  Right Knee pain    Sav Jim presents to [facility]  History of Present Illness    Patient states for the past year she has had recurring right knee pain mainly on the medial side.  No redness no warmth no swelling.  No injury.  States at times it does feel weak.  States she has really noticed a discomfort when she takes turns.  At times it does pop.  She is on Celebrex.  She feels it may be worsening in pain though.    Objective   Vital Signs:   /86   Pulse 83   Ht 154.9 cm (61\")   Wt 57.8 kg (127 lb 8 oz)   SpO2 99%   BMI 24.09 kg/mý     Body mass index is 24.09 kg/mý.    Review of Systems   Constitutional:  Negative for chills and fever.   Cardiovascular:  Negative for leg swelling.   Musculoskeletal:  Positive for arthralgias. Negative for joint swelling.   Skin:  Negative for rash.   Neurological:  Negative for weakness, numbness and headache.     Past History:  Medical History: has no past medical history on file.   Surgical History: has a past surgical history that includes Tubal ligation.   Family History: family history includes Cancer in her father; Diabetes in her mother; Hypertension in her mother.   Social History: reports that she quit smoking about 4 years ago. Her smoking use included cigarettes. She has a 7.50 pack-year smoking history. She has never used smokeless tobacco.    PHQ-2 Depression Screening  Little interest or pleasure in doing things?     Feeling down, depressed, or hopeless?     PHQ-2 Total Score          PHQ-9 Depression Screening  Little interest or pleasure in doing things?     Feeling down, depressed, or hopeless?     Trouble falling or staying asleep, or sleeping too much?     Feeling tired or having little energy?     Poor appetite or overeating?     Feeling bad about yourself - or that you are a failure or have let yourself or your family down?     Trouble concentrating on things, such as reading " the newspaper or watching television?     Moving or speaking so slowly that other people could have noticed? Or the opposite - being so fidgety or restless that you have been moving around a lot more than usual?     Thoughts that you would be better off dead, or of hurting yourself in some way?     PHQ-9 Total Score     If you checked off any problems, how difficult have these problems made it for you to do your work, take care of things at home, or get along with other people?       PHQ-9 Total Score:        Patient screened positive for depression based on a PHQ-9 score of 0 on 5/4/2023. Follow-up recommendations include:       Current Outpatient Medications:     atorvastatin (LIPITOR) 20 MG tablet, Take 1 tablet by mouth Daily., Disp: 90 tablet, Rfl: 1    escitalopram (LEXAPRO) 20 MG tablet, Take 1 tablet by mouth Daily., Disp: 90 tablet, Rfl: 1    meloxicam (MOBIC) 15 MG tablet, Take 1 tablet by mouth Daily., Disp: 30 tablet, Rfl: 2    valACYclovir (VALTREX) 1000 MG tablet, Take 1 tablet by mouth As Needed., Disp: , Rfl:     predniSONE (DELTASONE) 20 MG tablet, Take 2 tabs Po qd x 4 days then 1 tab Po qd x 3 days then 0.5 tab Po qd x 2 days, Disp: 12 tablet, Rfl: 0   (Not in a hospital admission)     Allergies: Patient has no known allergies.    Physical Exam  Constitutional:       Appearance: Normal appearance.   Musculoskeletal:      Right knee: No swelling, deformity, effusion, erythema or crepitus. Normal range of motion. Tenderness present. Normal alignment. Normal pulse.      Right lower leg: No edema.      Left lower leg: No edema.        Legs:       Comments: Slightly tender to palpation to right medial knee on area marked above.   Neurological:      General: No focal deficit present.      Mental Status: She is alert and oriented to person, place, and time. Mental status is at baseline.   Psychiatric:         Mood and Affect: Mood normal.         Behavior: Behavior normal.         Thought Content:  Thought content normal.         Judgment: Judgment normal.        Result Review :                   Assessment and Plan    Diagnoses and all orders for this visit:    1. Chronic pain of right knee (Primary)  Assessment & Plan:  X-ray completed.  Will let know if radiologist sees anything.  RICE encouraged.  We will try a course of prednisone.  Avoid NSAIDs including her meloxicam while she is taking the prednisone.  Risk of meds discussed understood.  Education provided.  Turn in 1 week if no improvement, sooner if worsens.  Return to clinic or ED with any issues or concerns.    Orders:  -     XR Knee 1 or 2 View Right (In Office)  -     predniSONE (DELTASONE) 20 MG tablet; Take 2 tabs Po qd x 4 days then 1 tab Po qd x 3 days then 0.5 tab Po qd x 2 days  Dispense: 12 tablet; Refill: 0              BMI is within normal parameters. No other follow-up for BMI required.       Follow Up   Return if symptoms worsen or fail to improve.  Patient was given instructions and counseling regarding her condition or for health maintenance advice. Please see specific information pulled into the AVS if appropriate.     YOMI Oneal

## 2023-09-24 ENCOUNTER — PATIENT MESSAGE (OUTPATIENT)
Dept: FAMILY MEDICINE CLINIC | Facility: CLINIC | Age: 53
End: 2023-09-24

## 2023-09-26 DIAGNOSIS — G89.29 CHRONIC PAIN OF RIGHT KNEE: Primary | ICD-10-CM

## 2023-09-26 DIAGNOSIS — M25.561 CHRONIC PAIN OF RIGHT KNEE: Primary | ICD-10-CM

## 2023-10-30 ENCOUNTER — TELEPHONE (OUTPATIENT)
Dept: FAMILY MEDICINE CLINIC | Facility: CLINIC | Age: 53
End: 2023-10-30
Payer: COMMERCIAL

## 2023-10-30 DIAGNOSIS — G89.29 CHRONIC PAIN OF RIGHT KNEE: Primary | ICD-10-CM

## 2023-10-30 DIAGNOSIS — M25.561 CHRONIC PAIN OF RIGHT KNEE: Primary | ICD-10-CM

## 2023-10-30 RX ORDER — MELOXICAM 15 MG/1
15 TABLET ORAL DAILY
Qty: 30 TABLET | Refills: 1 | Status: SHIPPED | OUTPATIENT
Start: 2023-10-30

## 2023-10-30 NOTE — TELEPHONE ENCOUNTER
Ortho referral placed. Please contact the insurance (or whoever that place is below) and let them know pt. Is not doing the MRI. Thanks

## 2023-10-30 NOTE — TELEPHONE ENCOUNTER
April with  [employer] called regarding :Pt's MRI. It has been scheduled for November 1, but the scan has been denied coverage by [facility]. Insurance is requiring Pt do 6 weeks of PT before approval. There is a [id number] available, April noted the information on the referral, also entered an explanation letter in the chart. She needs a callback as soon as possible to discuss cancelling/rescheduling. Please call her at [phone]

## 2023-11-07 ENCOUNTER — OFFICE VISIT (OUTPATIENT)
Dept: ORTHOPEDIC SURGERY | Facility: CLINIC | Age: 53
End: 2023-11-07
Payer: COMMERCIAL

## 2023-11-07 VITALS
SYSTOLIC BLOOD PRESSURE: 112 MMHG | DIASTOLIC BLOOD PRESSURE: 74 MMHG | HEIGHT: 61 IN | BODY MASS INDEX: 24.06 KG/M2 | WEIGHT: 127.43 LBS

## 2023-11-07 DIAGNOSIS — M17.11 PRIMARY OSTEOARTHRITIS OF RIGHT KNEE: Primary | ICD-10-CM

## 2023-11-07 DIAGNOSIS — S83.241A ACUTE MEDIAL MENISCUS TEAR OF RIGHT KNEE, INITIAL ENCOUNTER: ICD-10-CM

## 2023-11-07 NOTE — PROGRESS NOTES
[facility] [place]     Office Visit       Date: 11/07/2023   Patient Name: Allison Jim  MRN: [id number]  YOB: 1970    Referring Physician: Vinod Alston AP*     Chief Complaint:   Chief Complaint   Patient presents with    Right Knee - Pain     History of Present Illness: Allison Jim is a 53 y.o. female who is here today for evaluation of right knee pain.  Symptoms been ongoing for the last 6 months.  She rates her pain a 7/10 and describes as aching burning and throbbing.  In addition to pain, she is stiffness and instability.  Symptoms made worse by walking climbing stairs lying on affected side and rising from seated position.  Resting and heating have alleviated the pain.  She is also been on anti-inflammatories.  No known mechanism of injury.    Subjective   Review of Systems: Review of Systems   Constitutional:  Negative for chills, fever, unexpected weight gain and unexpected weight loss.   HENT:  Negative for congestion, postnasal drip and rhinorrhea.    Eyes:  Negative for blurred vision.   Respiratory:  Negative for shortness of breath.    Cardiovascular:  Negative for leg swelling.   Gastrointestinal:  Negative for abdominal pain, nausea and vomiting.   Genitourinary:  Negative for difficulty urinating.   Musculoskeletal:  Positive for arthralgias. Negative for gait problem, joint swelling and myalgias.   Skin:  Negative for skin lesions and wound.   Neurological:  Negative for dizziness, weakness, light-headedness and numbness.   Hematological:  Does not bruise/bleed easily.   Psychiatric/Behavioral:  Negative for depressed mood.         Past Medical History: No past medical history on file.    Past Surgical History:   Past Surgical History:   Procedure Laterality Date    TUBAL ABDOMINAL LIGATION         Family History:   Family History   Problem Relation Age of Onset    Hypertension Mother     Diabetes Mother     Cancer  "Father        Social History:   Social History     Socioeconomic History    Marital status:    Tobacco Use    Smoking status: Former     Packs/day: 0.50     Years: 15.00     Additional pack years: 0.00     Total pack years: 7.50     Types: Cigarettes     Quit date: 2019     Years since quittin.8    Smokeless tobacco: Never   Vaping Use    Vaping Use: Never used       Medications:   Current Outpatient Medications:     atorvastatin (LIPITOR) 20 MG tablet, Take 1 tablet by mouth Daily., Disp: 90 tablet, Rfl: 1    escitalopram (LEXAPRO) 20 MG tablet, Take 1 tablet by mouth Daily., Disp: 90 tablet, Rfl: 1    meloxicam (MOBIC) 15 MG tablet, TAKE 1 TABLET BY MOUTH EVERY DAY, Disp: 30 tablet, Rfl: 1    valACYclovir (VALTREX) 1000 MG tablet, Take 1 tablet by mouth As Needed., Disp: , Rfl:     Allergies: No Known Allergies    I reviewed the patient's chief complaint, history of present illness, review of systems, past medical history, surgical history, family history, social history, medications and allergy list.     Objective    Vital Signs:   Vitals:    23 1336   BP: 112/74   Weight: 57.8 kg (127 lb 6.8 oz)   Height: 154.9 cm (60.98\")     Body mass index is 24.09 kg/m².   BMI is within normal parameters. No other follow-up for BMI required.     Patient reports that she is a former smoker. She quit smoking in 2019.  She has not resumed smoking since that time.  This behavior was applauded and she was encouraged to continue in smoking cessation.  We will continue to monitor at subsequent visits.    Ortho Exam:  right knee exam:   knee contour shows mild swelling present.   Active range of motion 0° to 120°.   Passive range of motion 0° to 130°.   Negative varus or valgus instability.   Negative anterior or posterior laxity.   Negative Lachman exam.   positive tenderness to palpation at the medial joint line.   Negative tenderness at the lateral joint line.   tenderness over plica band at the medial knee. "   Negative patella instability or crepitus.   Negative plica trapped test.   Sensation grossly intact to the lower extremity and toes.   Positive medial Connor's test.  left knee exam:   Normal knee contour without evidence of swelling or ecchymosis.   Normal range of motion.   Palpable medial plica band but without tenderness.  normal gait.   no assistive device   No limp.   Normal body habitus.  Awake alert and oriented ×3 no acute distress.   calf is supple and nontender negative Lyssa.   Dorsalis pedis 2+ bilaterally.   Sensation intact to light touch.  Orientation: oriented to time, oriented to place, oriented to person  Mood and Affect: active and alert, normal mood, normal affect    Results Review:   Imaging Results (Last 24 Hours)       ** No results found for the last 24 hours. **        I personally reviewed the radiographs of the right knee from 8/22/2023.  No acute fracture or dislocation.  Very mild degenerative changes identified in the medial joint space.     Procedures    Assessment / Plan    Assessment/Plan:   Diagnoses and all orders for this visit:    1. Primary osteoarthritis of right knee (Primary)  -     MRI Knee Right Without Contrast; Future    2. Acute medial meniscus tear of right knee, initial encounter  -     MRI Knee Right Without Contrast; Future    Right knee pain localized along the medial joint line for the last 6+ months.  Radiographs of the right knee show very mild medial joint space narrowing.  Otherwise, normal radiographs.  She denies any known mechanism of injury.  Does note popping stiffness and instability swelling and occasional mechanical symptoms in the knee.  No previous history of knee injury or surgery.  She has been on Celebrex, meloxicam, and ibuprofen at various times for her pain but without significant relief of symptoms.  Given the relatively normal radiographs as well as ongoing mechanical symptoms that have not improved with conservative treatment, recommend  that we obtain MRI of her right knee.  I recommend that she continue with one of the NSAIDs but not more than 1 for pain control and swelling.  She should ice the joint.  I will see her back after the MRI discuss results and next steps.    Previous imaging studies reviewed: 8/22/2023-radiographs of the right knee.    Previous documentation reviewed: 8/22/2023-YOMI Oneal-office visit.    Previous laboratory results reviewed: 5/4/2023-hemoglobin A1c 5.8%.    Follow Up:   Return for MRI RIGHT KNEE REVIEW.      Elmo Blue MD  [facility] [place] [facility]

## 2023-11-22 DIAGNOSIS — F41.9 ANXIETY: ICD-10-CM

## 2023-11-22 RX ORDER — ESCITALOPRAM OXALATE 20 MG/1
20 TABLET ORAL DAILY
Qty: 90 TABLET | Refills: 0 | Status: SHIPPED | OUTPATIENT
Start: 2023-11-22

## 2023-12-08 DIAGNOSIS — E78.2 MIXED HYPERLIPIDEMIA: ICD-10-CM

## 2023-12-08 RX ORDER — ATORVASTATIN CALCIUM 20 MG/1
20 TABLET, FILM COATED ORAL DAILY
Qty: 90 TABLET | Refills: 0 | Status: SHIPPED | OUTPATIENT
Start: 2023-12-08

## 2024-01-16 RX ORDER — MELOXICAM 15 MG/1
15 TABLET ORAL DAILY
Qty: 30 TABLET | Refills: 0 | Status: SHIPPED | OUTPATIENT
Start: 2024-01-16

## 2024-01-23 ENCOUNTER — OFFICE VISIT (OUTPATIENT)
Dept: ORTHOPEDIC SURGERY | Facility: CLINIC | Age: 54
End: 2024-01-23
Payer: COMMERCIAL

## 2024-01-23 VITALS
HEIGHT: 61 IN | SYSTOLIC BLOOD PRESSURE: 116 MMHG | BODY MASS INDEX: 24.06 KG/M2 | WEIGHT: 127.43 LBS | DIASTOLIC BLOOD PRESSURE: 82 MMHG

## 2024-01-23 DIAGNOSIS — M89.9 OSTEOCHONDRAL LESION: ICD-10-CM

## 2024-01-23 DIAGNOSIS — S83.241D ACUTE MEDIAL MENISCUS TEAR OF RIGHT KNEE, SUBSEQUENT ENCOUNTER: Primary | ICD-10-CM

## 2024-01-23 DIAGNOSIS — M94.9 OSTEOCHONDRAL LESION: ICD-10-CM

## 2024-01-23 PROCEDURE — 99213 OFFICE O/P EST LOW 20 MIN: CPT | Performed by: STUDENT IN AN ORGANIZED HEALTH CARE EDUCATION/TRAINING PROGRAM

## 2024-01-23 NOTE — PROGRESS NOTES
[facility] [place] [facility] Follow Up Visit     Office Follow Up      Date: 01/23/2024   Patient Name: Allison Jim  MRN: [id number]  YOB: 1970    Referring Physician: No ref. provider found     Chief Complaint:   Chief Complaint   Patient presents with    Follow-up     MRI Right knee 12/14/23     History of Present Illness: Allison Jim is a 53 y.o. female who is here today for follow up on right knee pain that has been ongoing for the last 1 year.  Pain is a 6/10 and she has been on anti-inflammatories.  No previous injection or physical therapy.  No bracing.  Symptoms have improved since last visit.  She is also undergone MRI and is here today to discuss those results.    Subjective   Review of Systems: Review of Systems   Constitutional:  Negative for chills, fever, unexpected weight gain and unexpected weight loss.   HENT:  Negative for congestion, postnasal drip and rhinorrhea.    Eyes:  Negative for blurred vision.   Respiratory:  Negative for shortness of breath.    Cardiovascular:  Negative for leg swelling.   Gastrointestinal:  Negative for abdominal pain, nausea and vomiting.   Genitourinary:  Negative for difficulty urinating.   Musculoskeletal:  Positive for arthralgias. Negative for gait problem, joint swelling and myalgias.   Skin:  Negative for skin lesions and wound.   Neurological:  Negative for dizziness, weakness, light-headedness and numbness.   Hematological:  Does not bruise/bleed easily.   Psychiatric/Behavioral:  Negative for depressed mood.         Medications:   Current Outpatient Medications:     atorvastatin (LIPITOR) 20 MG tablet, TAKE 1 TABLET BY MOUTH EVERY DAY, Disp: 90 tablet, Rfl: 0    escitalopram (LEXAPRO) 20 MG tablet, TAKE 1 TABLET BY MOUTH EVERY DAY, Disp: 90 tablet, Rfl: 0    meloxicam (MOBIC) 15 MG tablet, TAKE 1 TABLET BY MOUTH EVERY DAY, Disp: 30 tablet, Rfl: 0    valACYclovir (VALTREX) 1000 MG tablet, Take 1  "tablet by mouth As Needed., Disp: , Rfl:     Allergies: No Known Allergies    I have reviewed and updated the patient's chief complaint, history of present illness, review of systems, past medical history, surgical history, family history, social history, medications and allergy list as appropriate.     Objective    Vital Signs:   Vitals:    01/23/24 0819   BP: 116/82   Weight: 57.8 kg (127 lb 6.8 oz)   Height: 154.9 cm (60.98\")     Body mass index is 24.09 kg/m².  BMI is within normal parameters. No other follow-up for BMI required.     Patient reports that she is a former smoker. She quit smoking in 2019.  She has not resumed smoking since that time.  This behavior was applauded and she was encouraged to continue in smoking cessation.  We will continue to monitor at subsequent visits.    Ortho Exam:  Right knee: No erythema, ecchymosis, swelling.  Tender to palpation over the medial joint line.  Full range of motion in flexion extension but pain is experienced with deep knee flexion.  She can get to 0 degrees extension, 130 degrees in flexion.  Stable to varus and valgus stress.  Negative anterior posterior drawer.  Positive medial Connor's.  Sensation intact to light touch.  5/5 strength.  2+ posterior tibial pulse.    Results Review:   Imaging Results (Last 24 Hours)       ** No results found for the last 24 hours. **        MRI results of the right knee from 12/14/2023 show complex tear to the anterior posterior horn of the medial meniscus as well as medial tibial plateau osteochondral lesion measuring 7 mm.    Procedures    Assessment / Plan    Assessment/Plan:   Diagnoses and all orders for this visit:    1. Acute medial meniscus tear of right knee, subsequent encounter (Primary)    2. Osteochondral lesion    Follow-up right knee-symptoms improved since last visit  Less mechanical symptoms, pain, swelling  MRI results show complex tear of medial meniscus as well as 7 mm osteochondral lesion in medial tibial " plateau  MRI findings discussed, as well as treatment options  Would like to hold off on bracing, additional medication, and corticosteroid injection given symptom improvement  Given a home exercise program today to strengthen hips core and quadriceps  Continue meloxicam as needed  Monitor symptoms moving forward and follow-up as symptoms dictate specifically mechanical symptoms of locking and catching    Follow Up:   Return if symptoms worsen or fail to improve.      Elmo Blue MD  [facility]

## 2024-03-02 DIAGNOSIS — F41.9 ANXIETY: ICD-10-CM

## 2024-03-04 RX ORDER — ESCITALOPRAM OXALATE 20 MG/1
20 TABLET ORAL DAILY
Qty: 30 TABLET | Refills: 0 | Status: SHIPPED | OUTPATIENT
Start: 2024-03-04

## 2024-03-14 DIAGNOSIS — E78.2 MIXED HYPERLIPIDEMIA: ICD-10-CM

## 2024-03-14 RX ORDER — ATORVASTATIN CALCIUM 20 MG/1
20 TABLET, FILM COATED ORAL DAILY
Qty: 30 TABLET | Refills: 0 | Status: SHIPPED | OUTPATIENT
Start: 2024-03-14

## 2024-03-20 ENCOUNTER — OFFICE VISIT (OUTPATIENT)
Dept: FAMILY MEDICINE CLINIC | Facility: CLINIC | Age: 54
End: 2024-03-20
Payer: COMMERCIAL

## 2024-03-20 VITALS
SYSTOLIC BLOOD PRESSURE: 122 MMHG | OXYGEN SATURATION: 98 % | HEART RATE: 73 BPM | WEIGHT: 127 LBS | HEIGHT: 61 IN | DIASTOLIC BLOOD PRESSURE: 60 MMHG | BODY MASS INDEX: 23.98 KG/M2

## 2024-03-20 DIAGNOSIS — E78.2 MIXED HYPERLIPIDEMIA: ICD-10-CM

## 2024-03-20 DIAGNOSIS — Z79.899 ENCOUNTER FOR LONG-TERM (CURRENT) USE OF OTHER MEDICATIONS: ICD-10-CM

## 2024-03-20 DIAGNOSIS — F41.9 ANXIETY: ICD-10-CM

## 2024-03-20 DIAGNOSIS — Z13.29 SCREENING FOR THYROID DISORDER: ICD-10-CM

## 2024-03-20 DIAGNOSIS — R73.03 PREDIABETES: Primary | ICD-10-CM

## 2024-03-20 DIAGNOSIS — Z11.59 NEED FOR HEPATITIS C SCREENING TEST: ICD-10-CM

## 2024-03-20 DIAGNOSIS — M25.50 ARTHRALGIA, UNSPECIFIED JOINT: ICD-10-CM

## 2024-03-20 DIAGNOSIS — Z12.31 ENCOUNTER FOR SCREENING MAMMOGRAM FOR MALIGNANT NEOPLASM OF BREAST: ICD-10-CM

## 2024-03-20 DIAGNOSIS — Z12.4 SCREENING FOR CERVICAL CANCER: ICD-10-CM

## 2024-03-20 RX ORDER — ATORVASTATIN CALCIUM 20 MG/1
20 TABLET, FILM COATED ORAL DAILY
Qty: 90 TABLET | Refills: 2 | Status: SHIPPED | OUTPATIENT
Start: 2024-03-20

## 2024-03-20 RX ORDER — MELOXICAM 15 MG/1
15 TABLET ORAL DAILY
Qty: 90 TABLET | Refills: 1 | Status: SHIPPED | OUTPATIENT
Start: 2024-03-20

## 2024-03-20 RX ORDER — ESCITALOPRAM OXALATE 20 MG/1
20 TABLET ORAL DAILY
Qty: 90 TABLET | Refills: 2 | Status: SHIPPED | OUTPATIENT
Start: 2024-03-20

## 2024-03-20 NOTE — PROGRESS NOTES
"Chief Complaint  Hyperlipidemia    Subjective          Allison Jim presents to [facility]  Hyperlipidemia  Pertinent negatives include no chest pain or shortness of breath.       Patient presents for med refills.  Hyperlipidemia well-controlled on atorvastatin.  Anxiety well-controlled on Lexapro.  Arthralgias well-controlled on meloxicam.  She was following up with orthopedics Dr. Blue regarding knee pain.  States they told her there is a tear in her meniscus.  They offered surgery versus injections but she states it started to feel better so she is just monitoring it for now.  She continues to follow-up annually with her gynecologist Dr. Perez who she states keeps up with her Pap smears and mammograms.  She knows that she will be due a Pap smear next month and she also states that she knows that she is due a mammogram next month.  No dizziness no headache no chest pain no chest pressure no shortness of breath no trouble breathing no urinary or bowel issues.  Overall states she is doing well.    Objective   Vital Signs:   /60 (BP Location: Left arm, Patient Position: Sitting, Cuff Size: Adult)   Pulse 73   Ht 154.9 cm (61\")   Wt 57.6 kg (127 lb)   SpO2 98%   BMI 24.00 kg/m²     Body mass index is 24 kg/m².    Review of Systems   Constitutional:  Negative for chills, fatigue and fever.   HENT:  Negative for congestion.    Eyes:  Negative for visual disturbance.   Respiratory:  Negative for cough, shortness of breath and wheezing.    Cardiovascular:  Negative for chest pain and palpitations.   Gastrointestinal:  Negative for abdominal pain, diarrhea, nausea and vomiting.   Genitourinary:  Negative for decreased urine volume, dysuria, frequency, hematuria and urgency.   Musculoskeletal:  Positive for arthralgias.   Skin:  Negative for color change, rash and wound.   Neurological:  Negative for dizziness, light-headedness and headache.   Psychiatric/Behavioral: Negative.   "       Past History:  Medical History: has a past medical history of Hyperlipidemia.   Surgical History: has a past surgical history that includes Tubal ligation.   Family History: family history includes Cancer in her father; Diabetes in her mother; Hypertension in her mother.   Social History: reports that she quit smoking about 5 years ago. Her smoking use included cigarettes. She started smoking about 20 years ago. She has a 7.5 pack-year smoking history. She has never used smokeless tobacco. She reports that she does not drink alcohol and does not use drugs.    PHQ-2 Depression Screening  Little interest or pleasure in doing things? 0-->not at all   Feeling down, depressed, or hopeless? 0-->not at all   PHQ-2 Total Score 0        PHQ-9 Depression Screening  Little interest or pleasure in doing things? 0-->not at all   Feeling down, depressed, or hopeless? 0-->not at all   Trouble falling or staying asleep, or sleeping too much?     Feeling tired or having little energy?     Poor appetite or overeating?     Feeling bad about yourself - or that you are a failure or have let yourself or your family down?     Trouble concentrating on things, such as reading the newspaper or watching television?     Moving or speaking so slowly that other people could have noticed? Or the opposite - being so fidgety or restless that you have been moving around a lot more than usual?     Thoughts that you would be better off dead, or of hurting yourself in some way?     PHQ-9 Total Score 0   If you checked off any problems, how difficult have these problems made it for you to do your work, take care of things at home, or get along with other people?       PHQ-9 Total Score: 0      Patient screened positive for depression based on a PHQ-9 score of 0 on 3/20/2024. Follow-up recommendations include:          Current Outpatient Medications:     atorvastatin (LIPITOR) 20 MG tablet, Take 1 tablet by mouth Daily., Disp: 90 tablet, Rfl: 2     escitalopram (LEXAPRO) 20 MG tablet, Take 1 tablet by mouth Daily., Disp: 90 tablet, Rfl: 2    meloxicam (MOBIC) 15 MG tablet, Take 1 tablet by mouth Daily., Disp: 90 tablet, Rfl: 1   (Not in a hospital admission)     Allergies: Patient has no known allergies.    Physical Exam  Constitutional:       Appearance: Normal appearance.   Eyes:      Conjunctiva/sclera: Conjunctivae normal.      Pupils: Pupils are equal, round, and reactive to light.   Cardiovascular:      Rate and Rhythm: Normal rate and regular rhythm.      Heart sounds: Normal heart sounds.   Pulmonary:      Effort: Pulmonary effort is normal.      Breath sounds: Normal breath sounds.   Neurological:      General: No focal deficit present.      Mental Status: She is alert and oriented to person, place, and time. Mental status is at baseline.   Psychiatric:         Attention and Perception: Attention and perception normal.         Mood and Affect: Mood and affect normal.         Speech: Speech normal.         Behavior: Behavior normal. Behavior is cooperative.         Thought Content: Thought content normal. Thought content does not include homicidal or suicidal ideation. Thought content does not include homicidal or suicidal plan.         Cognition and Memory: Cognition and memory normal.         Judgment: Judgment normal.          Result Review :                   Assessment and Plan    Diagnoses and all orders for this visit:    1. Prediabetes (Primary)  -     Hemoglobin A1c    2. Mixed hyperlipidemia  -     Lipid Panel  -     atorvastatin (LIPITOR) 20 MG tablet; Take 1 tablet by mouth Daily.  Dispense: 90 tablet; Refill: 2    3. Screening for cervical cancer    4. Encounter for screening mammogram for malignant neoplasm of breast    5. Encounter for long-term (current) use of other medications  -     CBC & Differential  -     Comprehensive Metabolic Panel    6. Anxiety  -     escitalopram (LEXAPRO) 20 MG tablet; Take 1 tablet by mouth Daily.   Dispense: 90 tablet; Refill: 2    7. Arthralgia, unspecified joint  -     meloxicam (MOBIC) 15 MG tablet; Take 1 tablet by mouth Daily.  Dispense: 90 tablet; Refill: 1    8. Need for hepatitis C screening test  -     Hepatitis C Antibody    9. Screening for thyroid disorder  -     TSH Rfx On Abnormal To Free T4      Meds refilled.  Labs drawn.  Goal blood pressure less than 140/90.  Let me know if gets 2 or above that.  Proper diet and exercise plan discussed and encouraged.  PHQ-9 completed and discussed.  No thoughts of suicide or hurting herself or anyone else.  Annual dental and eye exams encouraged.  Risk of meds discussed and understood.  Education provided.  She will continue to follow-up with her gynecologist who she states keeps up with her mammogram and Pap smears.  Denies all immunizations including pneumonia COVID and flu.  Encouraged her to discuss shingles vaccine with her pharmacist.  Return to clinic or ED with any issues or concerns.            BMI is within normal parameters. No other follow-up for BMI required.       Follow Up   Return in about 6 months (around 9/20/2024).  Patient was given instructions and counseling regarding her condition or for health maintenance advice. Please see specific information pulled into the AVS if appropriate.     YOMI Oneal

## 2024-03-21 LAB
ALBUMIN SERPL-MCNC: 4.6 G/DL (ref 3.8–4.9)
ALBUMIN/GLOB SERPL: 2 {RATIO} (ref 1.2–2.2)
ALP SERPL-CCNC: 98 IU/L (ref 44–121)
ALT SERPL-CCNC: 52 IU/L (ref 0–32)
AST SERPL-CCNC: 42 IU/L (ref 0–40)
BASOPHILS # BLD AUTO: 0 X10E3/UL (ref 0–0.2)
BASOPHILS NFR BLD AUTO: 1 %
BILIRUB SERPL-MCNC: 0.6 MG/DL (ref 0–1.2)
BUN SERPL-MCNC: 15 MG/DL (ref 6–24)
BUN/CREAT SERPL: 21 (ref 9–23)
CALCIUM SERPL-MCNC: 9.5 MG/DL (ref 8.7–10.2)
CHLORIDE SERPL-SCNC: 105 MMOL/L (ref 96–106)
CHOLEST SERPL-MCNC: 188 MG/DL (ref 100–199)
CO2 SERPL-SCNC: 21 MMOL/L (ref 20–29)
CREAT SERPL-MCNC: 0.73 MG/DL (ref 0.57–1)
EGFRCR SERPLBLD CKD-EPI 2021: 97 ML/MIN/1.73
EOSINOPHIL # BLD AUTO: 0.2 X10E3/UL (ref 0–0.4)
EOSINOPHIL NFR BLD AUTO: 4 %
ERYTHROCYTE [DISTWIDTH] IN BLOOD BY AUTOMATED COUNT: 12.4 % (ref 11.7–15.4)
GLOBULIN SER CALC-MCNC: 2.3 G/DL (ref 1.5–4.5)
GLUCOSE SERPL-MCNC: 93 MG/DL (ref 70–99)
HBA1C MFR BLD: 5.9 % (ref 4.8–5.6)
HCT VFR BLD AUTO: 39.1 % (ref 34–46.6)
HCV IGG SERPL QL IA: NON REACTIVE
HDLC SERPL-MCNC: 40 MG/DL
HGB BLD-MCNC: 13.2 G/DL (ref 11.1–15.9)
IMM GRANULOCYTES # BLD AUTO: 0 X10E3/UL (ref 0–0.1)
IMM GRANULOCYTES NFR BLD AUTO: 0 %
LDLC SERPL CALC-MCNC: 113 MG/DL (ref 0–99)
LYMPHOCYTES # BLD AUTO: 1.9 X10E3/UL (ref 0.7–3.1)
LYMPHOCYTES NFR BLD AUTO: 35 %
MCH RBC QN AUTO: 31.6 PG (ref 26.6–33)
MCHC RBC AUTO-ENTMCNC: 33.8 G/DL (ref 31.5–35.7)
MCV RBC AUTO: 94 FL (ref 79–97)
MONOCYTES # BLD AUTO: 0.4 X10E3/UL (ref 0.1–0.9)
MONOCYTES NFR BLD AUTO: 8 %
NEUTROPHILS # BLD AUTO: 2.9 X10E3/UL (ref 1.4–7)
NEUTROPHILS NFR BLD AUTO: 52 %
PLATELET # BLD AUTO: 211 X10E3/UL (ref 150–450)
POTASSIUM SERPL-SCNC: 4.2 MMOL/L (ref 3.5–5.2)
PROT SERPL-MCNC: 6.9 G/DL (ref 6–8.5)
RBC # BLD AUTO: 4.18 X10E6/UL (ref 3.77–5.28)
SODIUM SERPL-SCNC: 142 MMOL/L (ref 134–144)
TRIGL SERPL-MCNC: 198 MG/DL (ref 0–149)
TSH SERPL DL<=0.005 MIU/L-ACNC: 1.65 UIU/ML (ref 0.45–4.5)
VLDLC SERPL CALC-MCNC: 35 MG/DL (ref 5–40)
WBC # BLD AUTO: 5.5 X10E3/UL (ref 3.4–10.8)

## 2024-03-26 ENCOUNTER — TELEPHONE (OUTPATIENT)
Dept: FAMILY MEDICINE CLINIC | Facility: CLINIC | Age: 54
End: 2024-03-26
Payer: COMMERCIAL

## 2024-03-26 NOTE — TELEPHONE ENCOUNTER
Name: Allison Jim    Relationship: Self    Best Callback Number: [phone]    HUB PROVIDED THE RELAY MESSAGE FROM THE OFFICE   PATIENT     HAS FURTHER QUESTIONS AND WOULD LIKE A CALL BACK AT THE FOLLOWING PHONE NUMBER      ADDITIONAL INFORMATION: PATIENT WOULD LIKE TO KNOW IF MELOXICAM COULD HAVE AN EFFECT ON HER LEVELS BEING HIGH. PATIENT STATES TO RESPOND IN [facility]

## 2024-03-26 NOTE — TELEPHONE ENCOUNTER
HUB TO RELAY    Please let patient know from labs her liver enzymes the AST and ALT are mildly elevated.  Would recommend that we monitor these and recheck in 1 month.  In the meantime limit intake of Tylenol and alcohol, encourage healthy low-fat diet and 30 minutes exercise most days of the week.     Her triglycerides and LDL which is the bad cholesterol are both elevated.  She needs to continue with atorvastatin.  Due to her liver enzymes being elevated I do not recommend we increase the atorvastatin at this time so really stress healthy low-fat diet and daily exercise.     A1c has remained stable in the prediabetic range at 5.9.  Limit intake of sugar sweets carbs starches to help prevent this from increasing.     Remaining labs stable.  Thanks

## 2024-03-27 NOTE — TELEPHONE ENCOUNTER
Her liver enzymes level are MINIMALLY elevated so nothing bad. Meloxicam could possibly be a cause but I would doubt it. Cholesterol medications can sometimes cause a rise in liver enzymes. Majority of time though it is diet related. Low fat encouraged. I am not too concerned about it as her levels have been normal in the past and this only only such a small increase. Lets just keep and eye on it (recheck level 1 month) and if it increases we at that point can discuss if we need to make any changes. Nothing to be concerned or anxious about. Thanks

## 2024-04-22 ENCOUNTER — LAB (OUTPATIENT)
Dept: FAMILY MEDICINE CLINIC | Facility: CLINIC | Age: 54
End: 2024-04-22
Payer: COMMERCIAL

## 2024-04-22 DIAGNOSIS — R74.8 ELEVATED LIVER ENZYMES: Primary | ICD-10-CM

## 2024-04-23 LAB
ALBUMIN SERPL-MCNC: 4.7 G/DL (ref 3.8–4.9)
ALBUMIN/GLOB SERPL: 2 {RATIO} (ref 1.2–2.2)
ALP SERPL-CCNC: 106 IU/L (ref 44–121)
ALT SERPL-CCNC: 30 IU/L (ref 0–32)
AST SERPL-CCNC: 28 IU/L (ref 0–40)
BILIRUB SERPL-MCNC: 0.4 MG/DL (ref 0–1.2)
BUN SERPL-MCNC: 9 MG/DL (ref 6–24)
BUN/CREAT SERPL: 11 (ref 9–23)
CALCIUM SERPL-MCNC: 9.4 MG/DL (ref 8.7–10.2)
CHLORIDE SERPL-SCNC: 107 MMOL/L (ref 96–106)
CO2 SERPL-SCNC: 22 MMOL/L (ref 20–29)
CREAT SERPL-MCNC: 0.8 MG/DL (ref 0.57–1)
EGFRCR SERPLBLD CKD-EPI 2021: 87 ML/MIN/1.73
GLOBULIN SER CALC-MCNC: 2.3 G/DL (ref 1.5–4.5)
GLUCOSE SERPL-MCNC: 99 MG/DL (ref 70–99)
POTASSIUM SERPL-SCNC: 4.9 MMOL/L (ref 3.5–5.2)
PROT SERPL-MCNC: 7 G/DL (ref 6–8.5)
SODIUM SERPL-SCNC: 144 MMOL/L (ref 134–144)

## 2024-05-30 ENCOUNTER — OFFICE VISIT (OUTPATIENT)
Age: 54
End: 2024-05-30
Payer: COMMERCIAL

## 2024-05-30 VITALS
HEIGHT: 61 IN | WEIGHT: 122.7 LBS | SYSTOLIC BLOOD PRESSURE: 112 MMHG | DIASTOLIC BLOOD PRESSURE: 74 MMHG | BODY MASS INDEX: 23.17 KG/M2

## 2024-05-30 DIAGNOSIS — S83.241D ACUTE MEDIAL MENISCUS TEAR OF RIGHT KNEE, SUBSEQUENT ENCOUNTER: Primary | ICD-10-CM

## 2024-05-30 RX ORDER — LIDOCAINE HYDROCHLORIDE 10 MG/ML
4 INJECTION, SOLUTION EPIDURAL; INFILTRATION; INTRACAUDAL; PERINEURAL
Status: COMPLETED | OUTPATIENT
Start: 2024-05-30 | End: 2024-05-30

## 2024-05-30 RX ORDER — TRIAMCINOLONE ACETONIDE 40 MG/ML
80 INJECTION, SUSPENSION INTRA-ARTICULAR; INTRAMUSCULAR
Status: COMPLETED | OUTPATIENT
Start: 2024-05-30 | End: 2024-05-30

## 2024-05-30 RX ORDER — BUPIVACAINE HYDROCHLORIDE 5 MG/ML
4 INJECTION, SOLUTION EPIDURAL; INTRACAUDAL
Status: COMPLETED | OUTPATIENT
Start: 2024-05-30 | End: 2024-05-30

## 2024-05-30 RX ADMIN — TRIAMCINOLONE ACETONIDE 80 MG: 40 INJECTION, SUSPENSION INTRA-ARTICULAR; INTRAMUSCULAR at 11:49

## 2024-05-30 RX ADMIN — LIDOCAINE HYDROCHLORIDE 4 ML: 10 INJECTION, SOLUTION EPIDURAL; INFILTRATION; INTRACAUDAL; PERINEURAL at 11:49

## 2024-05-30 RX ADMIN — BUPIVACAINE HYDROCHLORIDE 4 ML: 5 INJECTION, SOLUTION EPIDURAL; INTRACAUDAL at 11:49

## 2024-05-30 NOTE — PROGRESS NOTES
[facility] [place] [facility] Office Follow Up Visit     Office Follow Up      Date: 05/30/2024   Patient Name: Allison Jim  MRN: [id number]  YOB: 1970    Referring Physician: No ref. provider found     Chief Complaint:   Chief Complaint   Patient presents with    Follow-up     4 month follow up; Acute medial meniscus tear of right knee       History of Present Illness: Allison Jim is a 54 y.o. female who is here today for follow up on right knee pain from an MRI confirmed medial meniscus tear.  Localizes pain today along the medial joint line.  Pain is a 6/10.  Meloxicam has been taking that without significant leaf of symptoms.  She has suffered a flare since the last visit without any true mechanism of injury.  Her pain is back to where it was originally before the improvement initially.    Subjective   Review of Systems: Review of Systems   Constitutional:  Negative for chills, fever, unexpected weight gain and unexpected weight loss.   HENT:  Negative for congestion, postnasal drip and rhinorrhea.    Eyes:  Negative for blurred vision.   Respiratory:  Negative for shortness of breath.    Cardiovascular:  Negative for leg swelling.   Gastrointestinal:  Negative for abdominal pain, nausea and vomiting.   Genitourinary:  Negative for difficulty urinating.   Musculoskeletal:  Positive for arthralgias. Negative for gait problem, joint swelling and myalgias.   Skin:  Negative for skin lesions and wound.   Neurological:  Negative for dizziness, weakness, light-headedness and numbness.   Hematological:  Does not bruise/bleed easily.   Psychiatric/Behavioral:  Negative for depressed mood.         Medications:   Current Outpatient Medications:     atorvastatin (LIPITOR) 20 MG tablet, Take 1 tablet by mouth Daily., Disp: 90 tablet, Rfl: 2    escitalopram (LEXAPRO) 20 MG tablet, Take 1 tablet by mouth Daily., Disp: 90 tablet, Rfl: 2    meloxicam (MOBIC) 15 MG tablet,  "Take 1 tablet by mouth Daily., Disp: 90 tablet, Rfl: 1    Allergies: No Known Allergies    I have reviewed and updated the patient's chief complaint, history of present illness, review of systems, past medical history, surgical history, family history, social history, medications and allergy list as appropriate.     Objective    Vital Signs:   Vitals:    05/30/24 1120   BP: 112/74   Weight: 55.7 kg (122 lb 11.2 oz)   Height: 154.9 cm (60.98\")     Body mass index is 23.2 kg/m².  BMI is within normal parameters. No other follow-up for BMI required.      Patient reports that she is a former smoker. She quit smoking in 2019.  She has not resumed smoking since that time.  This behavior was applauded and she was encouraged to continue in smoking cessation.  We will continue to monitor at subsequent visits.     Ortho Exam:  Right knee: No erythema, ecchymosis, swelling.  Tender to palpation over the medial joint line.  Full range of motion in flexion extension but pain is experienced with deep knee flexion.  She can get to 0 degrees extension, 130 degrees in flexion.  Stable to varus and valgus stress.  Negative anterior posterior drawer.  Positive medial Connor's.  Sensation intact to light touch.  5/5 strength.  2+ posterior tibial pulse.    Results Review:   Imaging Results (Last 24 Hours)       ** No results found for the last 24 hours. **            Procedures    Assessment / Plan    Assessment/Plan:   Diagnoses and all orders for this visit:    1. Acute medial meniscus tear of right knee, subsequent encounter (Primary)    Follow-up right knee pain from medial meniscus tear.  Symptoms flared over the last few weeks to months.  No significant proved with meloxicam.  Has been applying ice and getting some level of comfort.  Not having significant complaints of instability with the knee.  Pain is the primary complaint.  Therefore, I recommend a posterior injection to the right knee to control her pain.  Risk benefits " were discussed and she like to proceed.  Tolerated procedure well.  See procedure note.  I will have her follow-up in 7 weeks.  If she develops any symptoms of instability, locking, catching, or falls, I recommend bracing.  If pain continues and does not respond well to this injection, she may warrant surgical referral.    Follow Up:   Return in about 7 weeks (around 7/18/2024) for Recheck.      Elmo Blue MD  [facility] [place] [facility]

## 2024-05-30 NOTE — PROGRESS NOTES
Procedure   - Large Joint Arthrocentesis: R knee on 5/30/2024 11:49 AM  Indications: pain  Details: 21 G needle, ultrasound-guided superolateral approach  Medications: 4 mL bupivacaine (PF) 0.5 %; 80 mg triamcinolone acetonide 40 MG/ML; 4 mL lidocaine PF 1% 1 %  Outcome: tolerated well, no immediate complications  Procedure, treatment alternatives, risks and benefits explained, specific risks discussed. Consent was given by the patient. Immediately prior to procedure a time out was called to verify the correct patient, procedure, equipment, support staff and site/side marked as required. Patient was prepped and draped in the usual sterile fashion.

## 2024-07-30 ENCOUNTER — OFFICE VISIT (OUTPATIENT)
Dept: ORTHOPEDIC SURGERY | Facility: CLINIC | Age: 54
End: 2024-07-30
Payer: COMMERCIAL

## 2024-07-30 VITALS
BODY MASS INDEX: 23.03 KG/M2 | WEIGHT: 122 LBS | DIASTOLIC BLOOD PRESSURE: 70 MMHG | HEIGHT: 61 IN | SYSTOLIC BLOOD PRESSURE: 110 MMHG

## 2024-07-30 DIAGNOSIS — M89.9 OSTEOCHONDRAL LESION: ICD-10-CM

## 2024-07-30 DIAGNOSIS — M94.9 OSTEOCHONDRAL LESION: ICD-10-CM

## 2024-07-30 DIAGNOSIS — S83.241D ACUTE MEDIAL MENISCUS TEAR OF RIGHT KNEE, SUBSEQUENT ENCOUNTER: Primary | ICD-10-CM

## 2024-07-30 PROCEDURE — 99213 OFFICE O/P EST LOW 20 MIN: CPT | Performed by: STUDENT IN AN ORGANIZED HEALTH CARE EDUCATION/TRAINING PROGRAM

## 2024-07-30 NOTE — PROGRESS NOTES
[facility] [place] [facility] Office Follow Up Visit     Office Follow Up      Date: 07/30/2024   Patient Name: Allison Jim  MRN: [id number]  YOB: 1970    Referring Physician: No ref. provider found     Chief Complaint:   Chief Complaint   Patient presents with    Follow-up     8 week follow up --Acute medial meniscus tear of right knee       History of Present Illness: Allison Jim is a 54 y.o. female who returns to clinic today for follow up on right knee pain. She pain is a 7 /10 on the pain scale. Patient has tried the following previous treatments anti-inflammatories and injections: Cortisone/Visco. She mentions current symptoms of same as prior . She states that these treatments have stayed the same.    Subjective     Review of Systems: Review of Systems   Constitutional:  Negative for chills, fever, unexpected weight gain and unexpected weight loss.   HENT:  Negative for congestion, postnasal drip and rhinorrhea.    Eyes:  Negative for blurred vision.   Respiratory:  Negative for shortness of breath.    Cardiovascular:  Negative for leg swelling.   Gastrointestinal:  Negative for abdominal pain, nausea and vomiting.   Genitourinary:  Negative for difficulty urinating.   Musculoskeletal:  Positive for arthralgias. Negative for gait problem, joint swelling and myalgias.   Skin:  Negative for skin lesions and wound.   Neurological:  Negative for dizziness, weakness, light-headedness and numbness.   Hematological:  Does not bruise/bleed easily.   Psychiatric/Behavioral:  Negative for depressed mood.         Medications:   Current Outpatient Medications:     atorvastatin (LIPITOR) 20 MG tablet, Take 1 tablet by mouth Daily., Disp: 90 tablet, Rfl: 2    escitalopram (LEXAPRO) 20 MG tablet, Take 1 tablet by mouth Daily., Disp: 90 tablet, Rfl: 2    meloxicam (MOBIC) 15 MG tablet, Take 1 tablet by mouth Daily., Disp: 90 tablet, Rfl: 1    Allergies: No Known  "Allergies    I have reviewed and updated the patient's chief complaint, history of present illness, review of systems, past medical history, surgical history, family history, social history, medications and allergy list as appropriate.     Objective      Vital Signs:   Vitals:    07/30/24 1253   BP: 110/70   Weight: 55.3 kg (122 lb)   Height: 154.9 cm (60.98\")     Body mass index is 23.07 kg/m².  BMI is within normal parameters. No other follow-up for BMI required.    Patient reports that she is a former smoker. She quit smoking in 2019.  She has not resumed smoking since that time.  This behavior was applauded and she was encouraged to continue in smoking cessation.  We will continue to monitor at subsequent visits.     Ortho Exam:  Right knee: No erythema, ecchymosis, swelling.  Tender to palpation over the medial joint line.  Full range of motion in flexion extension but pain is experienced with deep knee flexion.  She can get to 0 degrees extension, 130 degrees in flexion.  Stable to varus and valgus stress.  Negative anterior posterior drawer.  Positive medial Connor's.  Sensation intact to light touch.  5/5 strength.  2+ posterior tibial pulse.    Results Review:   Imaging Results (Last 24 Hours)       ** No results found for the last 24 hours. **            Procedures    Assessment / Plan      Assessment/Plan:   Diagnoses and all orders for this visit:    1. Acute medial meniscus tear of right knee, subsequent encounter (Primary)    2. Osteochondral lesion    Follow-up on right knee pain for posterior horn medial meniscus tear, osteochondral lesion.  Symptoms improved initially with cortisone injection.  Symptoms have returned after vacation with a lot of walking.  She is not interested in surgical intervention at this time.  Recommend continue with anti-inflammatory medication with meloxicam.  We will fit her for a knee brace today to help make her gait more natural.  She will follow-up in 1 month for " repeat cortisone injection.    Follow Up:   Return in about 1 month (around 8/30/2024) for Recheck.      Elmo Blue MD  [facility] [place] [facility]

## 2024-08-27 ENCOUNTER — OFFICE VISIT (OUTPATIENT)
Dept: ORTHOPEDIC SURGERY | Facility: CLINIC | Age: 54
End: 2024-08-27
Payer: COMMERCIAL

## 2024-08-27 VITALS
SYSTOLIC BLOOD PRESSURE: 118 MMHG | BODY MASS INDEX: 23.03 KG/M2 | WEIGHT: 122 LBS | DIASTOLIC BLOOD PRESSURE: 66 MMHG | HEIGHT: 61 IN

## 2024-08-27 DIAGNOSIS — S83.241D ACUTE MEDIAL MENISCUS TEAR OF RIGHT KNEE, SUBSEQUENT ENCOUNTER: Primary | ICD-10-CM

## 2024-08-27 PROCEDURE — 20611 DRAIN/INJ JOINT/BURSA W/US: CPT | Performed by: STUDENT IN AN ORGANIZED HEALTH CARE EDUCATION/TRAINING PROGRAM

## 2024-08-27 RX ORDER — BUPIVACAINE HYDROCHLORIDE 2.5 MG/ML
4 INJECTION, SOLUTION EPIDURAL; INFILTRATION; INTRACAUDAL
Status: COMPLETED | OUTPATIENT
Start: 2024-08-27 | End: 2024-08-27

## 2024-08-27 RX ORDER — TRIAMCINOLONE ACETONIDE 40 MG/ML
80 INJECTION, SUSPENSION INTRA-ARTICULAR; INTRAMUSCULAR
Status: COMPLETED | OUTPATIENT
Start: 2024-08-27 | End: 2024-08-27

## 2024-08-27 RX ORDER — LIDOCAINE HYDROCHLORIDE 10 MG/ML
4 INJECTION, SOLUTION EPIDURAL; INFILTRATION; INTRACAUDAL; PERINEURAL
Status: COMPLETED | OUTPATIENT
Start: 2024-08-27 | End: 2024-08-27

## 2024-08-27 RX ADMIN — TRIAMCINOLONE ACETONIDE 80 MG: 40 INJECTION, SUSPENSION INTRA-ARTICULAR; INTRAMUSCULAR at 10:32

## 2024-08-27 RX ADMIN — BUPIVACAINE HYDROCHLORIDE 4 ML: 2.5 INJECTION, SOLUTION EPIDURAL; INFILTRATION; INTRACAUDAL at 10:32

## 2024-08-27 RX ADMIN — LIDOCAINE HYDROCHLORIDE 4 ML: 10 INJECTION, SOLUTION EPIDURAL; INFILTRATION; INTRACAUDAL; PERINEURAL at 10:32

## 2024-08-27 NOTE — PROGRESS NOTES
Procedure   - Large Joint Arthrocentesis: R knee on 8/27/2024 10:32 AM  Indications: pain  Details: 21 G needle, ultrasound-guided anterolateral approach  Medications: 4 mL bupivacaine (PF) 0.25 %; 4 mL lidocaine PF 1% 1 %; 80 mg triamcinolone acetonide 40 MG/ML  Outcome: tolerated well, no immediate complications  Procedure, treatment alternatives, risks and benefits explained, specific risks discussed. Consent was given by the patient. Immediately prior to procedure a time out was called to verify the correct patient, procedure, equipment, support staff and site/side marked as required. Patient was prepped and draped in the usual sterile fashion.        54-year-old female presents with right knee pain from medial meniscus tear.  Patient is here today for ultrasound-guided right knee corticosteroid injection.  Previous office documentation and images were personally reviewed prior to the visit.  We discussed them in detail how they correlate to experienced symptoms.  I explained the procedure in detail.  I answered all questions to the best my ability.  Risks and benefits as well as post procedure instructions were provided.  Patient elected to proceed and tolerated this procedure well.  See procedure note.  Follow-up with me will be on an as-needed basis.

## 2024-08-27 NOTE — PROGRESS NOTES
[facility] [place] [facility] Office Follow Up Visit     Office Follow Up      Date: 08/27/2024   Patient Name: Allison Jim  MRN: [id number]  YOB: 1970    Referring Physician: No ref. provider found     Chief Complaint:   Chief Complaint   Patient presents with    Follow-up     1 month follow up -- Acute medial meniscus tear of right knee       History of Present Illness: Allison Jim is a 54 y.o. female who returns to clinic today for follow up on right knee pain. Her pain is a 8 /10 on the pain scale. Patient has tried the following previous treatments bracing  and anti-inflammatories. She mentions current symptoms of same as prior . She states that these treatments have stayed the same.      Subjective     Review of Systems: Review of Systems   Constitutional:  Negative for chills, fever, unexpected weight gain and unexpected weight loss.   HENT:  Negative for congestion, postnasal drip and rhinorrhea.    Eyes:  Negative for blurred vision.   Respiratory:  Negative for shortness of breath.    Cardiovascular:  Negative for leg swelling.   Gastrointestinal:  Negative for abdominal pain, nausea and vomiting.   Genitourinary:  Negative for difficulty urinating.   Musculoskeletal:  Positive for arthralgias. Negative for gait problem, joint swelling and myalgias.   Skin:  Negative for skin lesions and wound.   Neurological:  Negative for dizziness, weakness, light-headedness and numbness.   Hematological:  Does not bruise/bleed easily.   Psychiatric/Behavioral:  Negative for depressed mood.       Medications:   Current Outpatient Medications:     atorvastatin (LIPITOR) 20 MG tablet, Take 1 tablet by mouth Daily., Disp: 90 tablet, Rfl: 2    escitalopram (LEXAPRO) 20 MG tablet, Take 1 tablet by mouth Daily., Disp: 90 tablet, Rfl: 2    meloxicam (MOBIC) 15 MG tablet, Take 1 tablet by mouth Daily., Disp: 90 tablet, Rfl: 1    Allergies: No Known Allergies    I have  "reviewed and updated the patient's chief complaint, history of present illness, review of systems, past medical history, surgical history, family history, social history, medications and allergy list as appropriate.     Objective      Vital Signs:   Vitals:    08/27/24 1006   Weight: 55.3 kg (122 lb)   Height: 154.9 cm (61\")     Body mass index is 23.05 kg/m².  BMI is within normal parameters. No other follow-up for BMI required.      Ortho Exam:  ***    Results Review:   Imaging Results (Last 24 Hours)       ** No results found for the last 24 hours. **            Procedures    Assessment / Plan      Assessment/Plan:   There are no diagnoses linked to this encounter.  ***    Follow Up:   No follow-ups on file.      Elmo Blue MD  [facility] [place] [facility]  "

## 2024-10-04 DIAGNOSIS — M25.50 ARTHRALGIA, UNSPECIFIED JOINT: ICD-10-CM

## 2024-10-07 RX ORDER — MELOXICAM 15 MG/1
15 TABLET ORAL DAILY
Qty: 90 TABLET | Refills: 0 | Status: SHIPPED | OUTPATIENT
Start: 2024-10-07

## 2024-11-12 ENCOUNTER — OFFICE VISIT (OUTPATIENT)
Dept: FAMILY MEDICINE CLINIC | Facility: CLINIC | Age: 54
End: 2024-11-12
Payer: COMMERCIAL

## 2024-11-12 VITALS
OXYGEN SATURATION: 99 % | DIASTOLIC BLOOD PRESSURE: 76 MMHG | WEIGHT: 127 LBS | BODY MASS INDEX: 23.98 KG/M2 | HEIGHT: 61 IN | HEART RATE: 68 BPM | SYSTOLIC BLOOD PRESSURE: 118 MMHG

## 2024-11-12 DIAGNOSIS — J06.9 ACUTE URI: Primary | ICD-10-CM

## 2024-11-12 DIAGNOSIS — J02.9 SORE THROAT: ICD-10-CM

## 2024-11-12 LAB
EXPIRATION DATE: NORMAL
EXPIRATION DATE: NORMAL
FLUAV AG UPPER RESP QL IA.RAPID: NOT DETECTED
FLUBV AG UPPER RESP QL IA.RAPID: NOT DETECTED
INTERNAL CONTROL: NORMAL
INTERNAL CONTROL: NORMAL
Lab: NORMAL
Lab: NORMAL
S PYO AG THROAT QL: NEGATIVE
SARS-COV-2 AG UPPER RESP QL IA.RAPID: NOT DETECTED

## 2024-11-12 PROCEDURE — 87880 STREP A ASSAY W/OPTIC: CPT | Performed by: STUDENT IN AN ORGANIZED HEALTH CARE EDUCATION/TRAINING PROGRAM

## 2024-11-12 PROCEDURE — 99213 OFFICE O/P EST LOW 20 MIN: CPT | Performed by: STUDENT IN AN ORGANIZED HEALTH CARE EDUCATION/TRAINING PROGRAM

## 2024-11-12 PROCEDURE — 87428 SARSCOV & INF VIR A&B AG IA: CPT | Performed by: STUDENT IN AN ORGANIZED HEALTH CARE EDUCATION/TRAINING PROGRAM

## 2024-11-12 NOTE — PROGRESS NOTES
".Chief Complaint  Sore Throat, Nasal Congestion, and Fatigue (Was feeling really bad yesterday, woke up from a nap feeling SOB and lightheaded, EMS came to house to check her out, everything checked out ok. )    Subjective          History of Present Illness  Allison Jim is here today with her  History of Present Illness  The patient presents for evaluation of a cough and sore throat.    She began experiencing a cough and sore throat the night before last, which persisted into the next day. Her cough was particularly disruptive that night, preventing her from sleeping. She also reported feeling unwell throughout the day, with symptoms of shortness of breath and lightheadedness. Despite taking promethazine DM at 3:00 AM, her symptoms did not improve. Her sister called paramedics who confirmed that her oxygen levels and blood pressure were normal.    Since then, she has been feeling better but continues to experience a sore throat, cough, headache, and neck pain. She took two ibuprofen tablets earlier this morning which has helped. She has not taken any other medications such as DayQuil or NyQuil. She has not used any inhalers or nebulizers and has not had a fever. Additionally, she reports a sensation of her ears being blocked.    Objective   Vital Signs:   /76   Pulse 68   Ht 154.9 cm (61\")   Wt 57.6 kg (127 lb)   SpO2 99%   BMI 24.00 kg/m²     Body mass index is 24 kg/m².  BMI is within normal parameters. No other follow-up for BMI required.      Review of Systems      Current Outpatient Medications:   •  atorvastatin (LIPITOR) 20 MG tablet, Take 1 tablet by mouth Daily., Disp: 90 tablet, Rfl: 2  •  escitalopram (LEXAPRO) 20 MG tablet, Take 1 tablet by mouth Daily., Disp: 90 tablet, Rfl: 2  •  meloxicam (MOBIC) 15 MG tablet, TAKE 1 TABLET BY MOUTH EVERY DAY, Disp: 90 tablet, Rfl: 0    Allergies: Patient has no known allergies.    Physical Exam  Vitals and nursing note reviewed.   Constitutional:       " General: She is not in acute distress.     Appearance: Normal appearance. She is normal weight. She is not ill-appearing, toxic-appearing or diaphoretic.   HENT:      Head: Normocephalic and atraumatic.      Right Ear: Ear canal and external ear normal. Tympanic membrane is bulging. Tympanic membrane is not injected or erythematous.      Left Ear: Ear canal and external ear normal. Tympanic membrane is bulging. Tympanic membrane is not injected or erythematous.      Nose: Nose normal.      Mouth/Throat:      Mouth: Mucous membranes are moist.      Pharynx: No posterior oropharyngeal erythema.   Eyes:      Pupils: Pupils are equal, round, and reactive to light.   Cardiovascular:      Rate and Rhythm: Normal rate and regular rhythm.      Heart sounds: Normal heart sounds.   Pulmonary:      Effort: Pulmonary effort is normal.      Breath sounds: Normal breath sounds.   Neurological:      General: No focal deficit present.      Mental Status: She is alert.   Psychiatric:         Mood and Affect: Mood normal.         Behavior: Behavior normal.      Physical Exam      Result Review :          Results  Laboratory Studies  Strep test: Negative. Covid test: Negative. Flu test: Negative.             Assessment and Plan    Diagnoses and all orders for this visit:    1. Acute URI (Primary)    2. Sore throat  -     POC Rapid Strep A  -     POCT SARS-CoV-2 + Flu Antigen REX      Assessment & Plan    Negative results were obtained for strep, COVID-19, and influenza tests. Her symptoms have been improving overall. The presence of fluid behind the ears was noted, but it was clear. DayQuil was recommended for symptom relief. She was also advised to take 3 to 4 ibuprofen tablets for pain management. If symptoms worsen, such as increased cough or green sputum, she should inform the clinic immediately.        Follow Up   No follow-ups on file.  Patient was given instructions and counseling regarding her condition or for health  maintenance advice. Please see specific information pulled into the AVS if appropriate.     Patient or patient representative verbalized consent for the use of Ambient Listening during the visit with  SCHUYLER Chu for chart documentation. 11/12/2024  16:20 SCHUYLER Leiva  11/12/2024

## 2024-12-18 ENCOUNTER — OFFICE VISIT (OUTPATIENT)
Age: 54
End: 2024-12-18
Payer: COMMERCIAL

## 2024-12-18 DIAGNOSIS — S83.241D ACUTE MEDIAL MENISCUS TEAR OF RIGHT KNEE, SUBSEQUENT ENCOUNTER: Primary | ICD-10-CM

## 2024-12-18 RX ORDER — TRETINOIN 1 MG/G
CREAM TOPICAL
COMMUNITY
Start: 2024-12-05

## 2024-12-18 RX ORDER — TRIAMCINOLONE ACETONIDE 40 MG/ML
80 INJECTION, SUSPENSION INTRA-ARTICULAR; INTRAMUSCULAR
Status: COMPLETED | OUTPATIENT
Start: 2024-12-18 | End: 2024-12-18

## 2024-12-18 RX ORDER — LIDOCAINE HYDROCHLORIDE 10 MG/ML
4 INJECTION, SOLUTION EPIDURAL; INFILTRATION; INTRACAUDAL; PERINEURAL
Status: COMPLETED | OUTPATIENT
Start: 2024-12-18 | End: 2024-12-18

## 2024-12-18 RX ORDER — BUPIVACAINE HYDROCHLORIDE 5 MG/ML
4 INJECTION, SOLUTION EPIDURAL; INTRACAUDAL
Status: COMPLETED | OUTPATIENT
Start: 2024-12-18 | End: 2024-12-18

## 2024-12-18 RX ADMIN — TRIAMCINOLONE ACETONIDE 80 MG: 40 INJECTION, SUSPENSION INTRA-ARTICULAR; INTRAMUSCULAR at 10:50

## 2024-12-18 RX ADMIN — LIDOCAINE HYDROCHLORIDE 4 ML: 10 INJECTION, SOLUTION EPIDURAL; INFILTRATION; INTRACAUDAL; PERINEURAL at 10:50

## 2024-12-18 RX ADMIN — BUPIVACAINE HYDROCHLORIDE 4 ML: 5 INJECTION, SOLUTION EPIDURAL; INTRACAUDAL at 10:50

## 2024-12-18 NOTE — PROGRESS NOTES
Procedure   - Large Joint Arthrocentesis: R knee on 12/18/2024 10:50 AM  Indications: pain  Details: 21 G needle, ultrasound-guided superolateral approach  Medications: 4 mL bupivacaine (PF) 0.5 %; 80 mg triamcinolone acetonide 40 MG/ML; 4 mL lidocaine PF 1% 1 %  Outcome: tolerated well, no immediate complications  Procedure, treatment alternatives, risks and benefits explained, specific risks discussed. Consent was given by the patient. Immediately prior to procedure a time out was called to verify the correct patient, procedure, equipment, support staff and site/side marked as required. Patient was prepped and draped in the usual sterile fashion.        54-year-old female presents with right knee pain from medial meniscus tear.  Patient is here today for ultrasound-guided right knee corticosteroid injection.  Previous office documentation and images were personally reviewed prior to the visit.  We discussed them in detail how they correlate to experienced symptoms.  I explained the procedure in detail.  I answered all questions to the best my ability.  Risks and benefits as well as post procedure instructions were provided.  Patient elected to proceed and tolerated this procedure well.  See procedure note.  Follow-up with me will be on an as-needed basis.

## 2025-01-03 DIAGNOSIS — E78.2 MIXED HYPERLIPIDEMIA: ICD-10-CM

## 2025-01-03 RX ORDER — ATORVASTATIN CALCIUM 20 MG/1
20 TABLET, FILM COATED ORAL DAILY
Qty: 30 TABLET | Refills: 0 | Status: SHIPPED | OUTPATIENT
Start: 2025-01-03

## 2025-01-08 DIAGNOSIS — M25.50 ARTHRALGIA, UNSPECIFIED JOINT: ICD-10-CM

## 2025-01-08 RX ORDER — MELOXICAM 15 MG/1
15 TABLET ORAL DAILY
Qty: 90 TABLET | Refills: 0 | Status: SHIPPED | OUTPATIENT
Start: 2025-01-08

## 2025-01-09 DIAGNOSIS — F41.9 ANXIETY: ICD-10-CM

## 2025-01-09 RX ORDER — ESCITALOPRAM OXALATE 20 MG/1
20 TABLET ORAL DAILY
Qty: 90 TABLET | Refills: 1 | Status: SHIPPED | OUTPATIENT
Start: 2025-01-09

## 2025-02-13 DIAGNOSIS — E78.2 MIXED HYPERLIPIDEMIA: ICD-10-CM

## 2025-02-14 RX ORDER — ATORVASTATIN CALCIUM 20 MG/1
20 TABLET, FILM COATED ORAL DAILY
Qty: 30 TABLET | Refills: 0 | Status: SHIPPED | OUTPATIENT
Start: 2025-02-14

## 2025-03-21 DIAGNOSIS — E78.2 MIXED HYPERLIPIDEMIA: ICD-10-CM

## 2025-03-21 RX ORDER — ATORVASTATIN CALCIUM 20 MG/1
20 TABLET, FILM COATED ORAL DAILY
Qty: 30 TABLET | Refills: 1 | Status: SHIPPED | OUTPATIENT
Start: 2025-03-21

## 2025-04-22 DIAGNOSIS — M25.50 ARTHRALGIA, UNSPECIFIED JOINT: ICD-10-CM

## 2025-04-22 RX ORDER — MELOXICAM 15 MG/1
15 TABLET ORAL DAILY
Qty: 30 TABLET | Refills: 0 | Status: SHIPPED | OUTPATIENT
Start: 2025-04-22

## 2025-04-29 ENCOUNTER — OFFICE VISIT (OUTPATIENT)
Dept: ORTHOPEDIC SURGERY | Facility: CLINIC | Age: 55
End: 2025-04-29
Payer: COMMERCIAL

## 2025-04-29 DIAGNOSIS — S83.241D ACUTE MEDIAL MENISCUS TEAR OF RIGHT KNEE, SUBSEQUENT ENCOUNTER: Primary | ICD-10-CM

## 2025-04-29 PROCEDURE — 20610 DRAIN/INJ JOINT/BURSA W/O US: CPT | Performed by: STUDENT IN AN ORGANIZED HEALTH CARE EDUCATION/TRAINING PROGRAM

## 2025-04-29 RX ORDER — BUPIVACAINE HYDROCHLORIDE 2.5 MG/ML
4 INJECTION, SOLUTION EPIDURAL; INFILTRATION; INTRACAUDAL; PERINEURAL
Status: COMPLETED | OUTPATIENT
Start: 2025-04-29 | End: 2025-04-29

## 2025-04-29 RX ORDER — LIDOCAINE HYDROCHLORIDE 10 MG/ML
4 INJECTION, SOLUTION EPIDURAL; INFILTRATION; INTRACAUDAL; PERINEURAL
Status: COMPLETED | OUTPATIENT
Start: 2025-04-29 | End: 2025-04-29

## 2025-04-29 RX ORDER — TRIAMCINOLONE ACETONIDE 40 MG/ML
80 INJECTION, SUSPENSION INTRA-ARTICULAR; INTRAMUSCULAR
Status: COMPLETED | OUTPATIENT
Start: 2025-04-29 | End: 2025-04-29

## 2025-04-29 RX ADMIN — LIDOCAINE HYDROCHLORIDE 4 ML: 10 INJECTION, SOLUTION EPIDURAL; INFILTRATION; INTRACAUDAL; PERINEURAL at 09:39

## 2025-04-29 RX ADMIN — BUPIVACAINE HYDROCHLORIDE 4 ML: 2.5 INJECTION, SOLUTION EPIDURAL; INFILTRATION; INTRACAUDAL; PERINEURAL at 09:39

## 2025-04-29 RX ADMIN — TRIAMCINOLONE ACETONIDE 80 MG: 40 INJECTION, SUSPENSION INTRA-ARTICULAR; INTRAMUSCULAR at 09:39

## 2025-04-29 NOTE — PROGRESS NOTES
Procedure   - Large Joint Arthrocentesis: R knee on 4/29/2025 9:39 AM  Indications: pain  Details: 21 G needle, superolateral approach  Medications: 4 mL bupivacaine (PF) 0.25 %; 4 mL lidocaine PF 1% 1 %; 80 mg triamcinolone acetonide 40 MG/ML  Outcome: tolerated well, no immediate complications  Procedure, treatment alternatives, risks and benefits explained, specific risks discussed. Consent was given by the patient. Immediately prior to procedure a time out was called to verify the correct patient, procedure, equipment, support staff and site/side marked as required. Patient was prepped and draped in the usual sterile fashion.        55-year-old female presents with right knee pain from medial meniscus tear. Patient is here today for ultrasound-guided right knee corticosteroid injection. Previous office documentation and images were personally reviewed prior to the visit. We discussed them in detail how they correlate to experienced symptoms. I explained the procedure in detail. I answered all questions to the best my ability. Risks and benefits as well as post procedure instructions were provided. Patient elected to proceed and tolerated this procedure well. See procedure note. Follow-up with me will be on an as-needed basis.

## 2025-05-29 ENCOUNTER — OFFICE VISIT (OUTPATIENT)
Dept: FAMILY MEDICINE CLINIC | Facility: CLINIC | Age: 55
End: 2025-05-29
Payer: COMMERCIAL

## 2025-05-29 VITALS
OXYGEN SATURATION: 98 % | SYSTOLIC BLOOD PRESSURE: 110 MMHG | WEIGHT: 127.06 LBS | HEART RATE: 78 BPM | BODY MASS INDEX: 23.99 KG/M2 | HEIGHT: 61 IN | DIASTOLIC BLOOD PRESSURE: 78 MMHG

## 2025-05-29 DIAGNOSIS — M25.561 CHRONIC PAIN OF RIGHT KNEE: ICD-10-CM

## 2025-05-29 DIAGNOSIS — Z00.00 ANNUAL PHYSICAL EXAM: Primary | ICD-10-CM

## 2025-05-29 DIAGNOSIS — E78.2 MIXED HYPERLIPIDEMIA: ICD-10-CM

## 2025-05-29 DIAGNOSIS — Z23 IMMUNIZATION DUE: ICD-10-CM

## 2025-05-29 DIAGNOSIS — M25.50 ARTHRALGIA, UNSPECIFIED JOINT: ICD-10-CM

## 2025-05-29 DIAGNOSIS — F41.9 ANXIETY: ICD-10-CM

## 2025-05-29 DIAGNOSIS — G89.29 CHRONIC PAIN OF RIGHT KNEE: ICD-10-CM

## 2025-05-29 DIAGNOSIS — Z12.31 ENCOUNTER FOR SCREENING MAMMOGRAM FOR MALIGNANT NEOPLASM OF BREAST: ICD-10-CM

## 2025-05-29 DIAGNOSIS — Z12.11 SCREENING FOR COLON CANCER: ICD-10-CM

## 2025-05-29 DIAGNOSIS — Z79.899 ENCOUNTER FOR LONG-TERM (CURRENT) USE OF MEDICATIONS: ICD-10-CM

## 2025-05-29 DIAGNOSIS — Z12.4 SCREENING FOR CERVICAL CANCER: ICD-10-CM

## 2025-05-29 DIAGNOSIS — Z12.2 SCREENING FOR LUNG CANCER: ICD-10-CM

## 2025-05-29 DIAGNOSIS — J30.2 SEASONAL ALLERGIC RHINITIS, UNSPECIFIED TRIGGER: ICD-10-CM

## 2025-05-29 DIAGNOSIS — H93.8X1 EAR FULLNESS, RIGHT: ICD-10-CM

## 2025-05-29 DIAGNOSIS — R73.03 PREDIABETES: ICD-10-CM

## 2025-05-29 PROBLEM — H93.8X3 EAR FULLNESS, BILATERAL: Status: RESOLVED | Noted: 2022-10-27 | Resolved: 2025-05-29

## 2025-05-29 PROBLEM — Z11.59 NEED FOR HEPATITIS C SCREENING TEST: Status: RESOLVED | Noted: 2023-05-04 | Resolved: 2025-05-29

## 2025-05-29 RX ORDER — MELOXICAM 15 MG/1
15 TABLET ORAL DAILY
Qty: 90 TABLET | Refills: 1 | Status: SHIPPED | OUTPATIENT
Start: 2025-05-29

## 2025-05-29 RX ORDER — ESCITALOPRAM OXALATE 20 MG/1
20 TABLET ORAL DAILY
Qty: 90 TABLET | Refills: 1 | Status: SHIPPED | OUTPATIENT
Start: 2025-05-29

## 2025-05-29 RX ORDER — ATORVASTATIN CALCIUM 20 MG/1
20 TABLET, FILM COATED ORAL DAILY
Qty: 90 TABLET | Refills: 1 | Status: SHIPPED | OUTPATIENT
Start: 2025-05-29

## 2025-05-29 NOTE — PROGRESS NOTES
"Chief Complaint  Annual Exam    Subjective          Allison Jim presents to [facility]  History of Present Illness  History of Present Illness  The patient is a 55-year-old female who presents for an annual exam. She has a history of allergies, hyperlipidemia, prediabetes, anxiety, and arthralgias. Her current medications include atorvastatin, Lexapro, and meloxicam. Overall, she reports doing well. A mammogram was completed on 10/15/2024, and she will follow up for a Pap smear with her gynecologist, Dr. Perez. A colonoscopy was completed on 11/18/2019 and is good for 10 years.    She has a history of smoking for over 20 years, with periods of heavy usage, but ceased this habit in 2019 (we discuss this and I clarified her smoking times and it is over 1pk/day for 20 years so she does meet guidelines for LDCT). She has not received the pneumonia vaccine and has no known history of pneumonia. She reports no adverse reactions to previous vaccinations. She utilizes contact lenses for vision correction.    She experiences persistent muffling in her right ear. She takes Zyrtec nightly and uses a nasal spray intermittently.    She has been receiving injections in her right knee from Dr. Blue due to a torn meniscus. These injections provide relief for approximately 2 to 2.5 months before the pain returns.    Overall, state she is doing well.     SOCIAL HISTORY  The patient quit smoking in 2019 after smoking for over 20 years.    Patient Care Team:  Vinod Alston APRN as PCP - General (Nurse Practitioner)  Vinod Alston APRN (Nurse Practitioner)  Tushar Blue MD as Surgeon (Orthopedic Surgery)  Danielle Perez DO as Obstetrician (Obstetrics and Gynecology)     Objective   Vital Signs:   /78   Pulse 78   Ht 154.9 cm (61\")   Wt 57.6 kg (127 lb 1 oz)   SpO2 98%   BMI 24.01 kg/m²     Body mass index is 24.01 kg/m².    Review of Systems   Constitutional: Negative. "    HENT:  Negative for congestion, ear discharge, ear pain, nosebleeds, postnasal drip, rhinorrhea, sinus pressure, sneezing, sore throat, swollen glands and trouble swallowing.    Eyes: Negative.    Respiratory: Negative.     Cardiovascular: Negative.    Gastrointestinal: Negative.    Endocrine: Negative for polydipsia, polyphagia and polyuria.   Genitourinary: Negative.    Musculoskeletal:  Positive for arthralgias. Negative for joint swelling.   Skin: Negative.    Neurological: Negative.    Psychiatric/Behavioral: Negative.         Past History:  Medical History: has a past medical history of Anxiety, Hyperlipidemia, and Tear of meniscus of knee.   Surgical History: has a past surgical history that includes Tubal ligation.   Family History: family history includes Cancer in her father; Diabetes in her mother; Hypertension in her mother.   Social History: reports that she quit smoking about 6 years ago. Her smoking use included cigarettes. She started smoking about 29 years ago. She has a 23 pack-year smoking history. She has never used smokeless tobacco. She reports that she does not drink alcohol and does not use drugs.    PHQ-2 Depression Screening  Little interest or pleasure in doing things? Not at all   Feeling down, depressed, or hopeless? Not at all   PHQ-2 Total Score 0       PHQ-9 Depression Screening  Little interest or pleasure in doing things? Not at all   Feeling down, depressed, or hopeless? Not at all   PHQ-2 Total Score 0   Trouble falling or staying asleep, or sleeping too much?     Feeling tired or having little energy?     Poor appetite or overeating?     Feeling bad about yourself - or that you are a failure or have let yourself or your family down?     Trouble concentrating on things, such as reading the newspaper or watching television?     Moving or speaking so slowly that other people could have noticed? Or the opposite - being so fidgety or restless that you have been moving around a lot  more than usual?     Thoughts that you would be better off dead, or of hurting yourself in some way?     PHQ-9 Total Score     If you checked off any problems, how difficult have these problems made it for you to do your work, take care of things at home, or get along with other people? Not difficult at all        PHQ-9 Total Score:        Patient screened positive for depression based on a PHQ-9 score of  on . Follow-up recommendations include:       Current Outpatient Medications:     atorvastatin (LIPITOR) 20 MG tablet, Take 1 tablet by mouth Daily., Disp: 90 tablet, Rfl: 1    escitalopram (LEXAPRO) 20 MG tablet, Take 1 tablet by mouth Daily., Disp: 90 tablet, Rfl: 1    meloxicam (MOBIC) 15 MG tablet, Take 1 tablet by mouth Daily., Disp: 90 tablet, Rfl: 1   (Not in a hospital admission)     Allergies: Patient has no known allergies.    Physical Exam  Constitutional:       Appearance: Normal appearance.   HENT:      Head: Normocephalic.      Right Ear: Ear canal and external ear normal.      Left Ear: Tympanic membrane, ear canal and external ear normal.      Ears:      Comments: Right TM bulging. No erythema.      Nose: Nose normal.      Mouth/Throat:      Mouth: Mucous membranes are moist.      Pharynx: Oropharynx is clear.   Eyes:      Extraocular Movements: Extraocular movements intact.      Conjunctiva/sclera: Conjunctivae normal.      Pupils: Pupils are equal, round, and reactive to light.   Cardiovascular:      Rate and Rhythm: Normal rate and regular rhythm.      Heart sounds: Normal heart sounds.   Pulmonary:      Effort: Pulmonary effort is normal.      Breath sounds: Normal breath sounds.   Abdominal:      General: Abdomen is flat. Bowel sounds are normal.      Palpations: Abdomen is soft.   Genitourinary:     Comments: Denied   Musculoskeletal:         General: Normal range of motion.      Cervical back: Normal range of motion.   Skin:     General: Skin is warm.      Findings: No erythema or rash.    Neurological:      General: No focal deficit present.      Mental Status: She is alert and oriented to person, place, and time. Mental status is at baseline.   Psychiatric:         Mood and Affect: Mood normal.         Behavior: Behavior normal.         Thought Content: Thought content normal.         Judgment: Judgment normal.        Physical Exam      Result Review :          Results               Assessment and Plan    Diagnoses and all orders for this visit:    1. Annual physical exam (Primary)  -     CBC & Differential  -     Comprehensive Metabolic Panel  -     TSH Rfx On Abnormal To Free T4  -     POC Urinalysis Dipstick, Automated; Future    2. Seasonal allergic rhinitis, unspecified trigger    3. Mixed hyperlipidemia  -     atorvastatin (LIPITOR) 20 MG tablet; Take 1 tablet by mouth Daily.  Dispense: 90 tablet; Refill: 1  -     Lipid Panel    4. Ear fullness, right    5. Prediabetes  -     Hemoglobin A1c    6. Screening for colon cancer    7. Screening for cervical cancer    8. Encounter for screening mammogram for malignant neoplasm of breast    9. Encounter for long-term (current) use of medications    10. Immunization due  -     Pneumococcal Conjugate Vaccine 20-Valent (PCV20)    11. Anxiety  -     escitalopram (LEXAPRO) 20 MG tablet; Take 1 tablet by mouth Daily.  Dispense: 90 tablet; Refill: 1    12. Chronic pain of right knee    13. Arthralgia, unspecified joint  -     meloxicam (MOBIC) 15 MG tablet; Take 1 tablet by mouth Daily.  Dispense: 90 tablet; Refill: 1    14. Screening for lung cancer  -     CT Chest Low Dose Wo; Future      Assessment & Plan  1. Annual examination.  - She knows she is due a Pap smear and states she will discuss with her gynecologist.  - Comprehensive blood workup including liver, kidney, cholesterol, and thyroid function tests, along with a urine sample.  UA completed.  - Pneumonia vaccine to be administered today.  Vaccine information sheet given.  Risk discussed and  understood.  Patient tolerated well.  - Low-dose lung CT scan ordered due to smoking history of over 20 years, averaging about a pack a day.   - Goal blood pressure less than 140/90.  Let me know if gets to or above that.  PHQ-9 completed and discussed.  No thoughts of suicide or hurting herself or anyone else.  Proper diet and exercise plan discussed and encouraged.  Annual dental and eye exams encouraged.  She states she will keep up-to-date with her yearly mammograms and knows that she is due in October.  States she will discuss Pap smears with her gynecologist Dr. Perez.  Colonoscopy up-to-date.  Will schedule low-dose lung CT scan.  Risk of meds discussed and understood.  Education provided.  Return to clinic or ED with any issues or concerns.    2. Right knee pain.  - Reports receiving injections in the right knee for a torn meniscus, providing relief for about 2 to 2.5 months.  - Continues current treatment plan under the care of Dr. kemp.    3. Allergic rhinitis and right ear fullness.  - Uses Zyrtec nightly and occasionally uses a nasal spray.  - Advised to use nasal spray consistently, two squirts in each nostril daily for at least 2 weeks.  - If symptoms persist, a 5-day course of prednisone will be considered.  She will keep me updated.  - Referral to an ear, nose, and throat specialist if no improvement  - She will keep me updated on the status of this.            BMI is within normal parameters. No other follow-up for BMI required.       Follow Up   Return in about 6 months (around 11/29/2025), or if symptoms worsen or fail to improve.  Patient was given instructions and counseling regarding her condition or for health maintenance advice. Please see specific information pulled into the AVS if appropriate.     Patient or patient representative verbalized consent for the use of Ambient Listening during the visit with  YOMI Oneal for chart documentation. 5/29/2025  11:37 EDT    Vinod  Gamaliel, APRN

## 2025-05-30 LAB
ALBUMIN SERPL-MCNC: 4.5 G/DL (ref 3.8–4.9)
ALP SERPL-CCNC: 106 IU/L (ref 44–121)
ALT SERPL-CCNC: 25 IU/L (ref 0–32)
AST SERPL-CCNC: 33 IU/L (ref 0–40)
BASOPHILS # BLD AUTO: 0 X10E3/UL (ref 0–0.2)
BASOPHILS NFR BLD AUTO: 1 %
BILIRUB SERPL-MCNC: 0.3 MG/DL (ref 0–1.2)
BUN SERPL-MCNC: 13 MG/DL (ref 6–24)
BUN/CREAT SERPL: 18 (ref 9–23)
CALCIUM SERPL-MCNC: 9.4 MG/DL (ref 8.7–10.2)
CHLORIDE SERPL-SCNC: 105 MMOL/L (ref 96–106)
CHOLEST SERPL-MCNC: 185 MG/DL (ref 100–199)
CO2 SERPL-SCNC: 19 MMOL/L (ref 20–29)
CREAT SERPL-MCNC: 0.71 MG/DL (ref 0.57–1)
EGFRCR SERPLBLD CKD-EPI 2021: 100 ML/MIN/1.73
EOSINOPHIL # BLD AUTO: 0.2 X10E3/UL (ref 0–0.4)
EOSINOPHIL NFR BLD AUTO: 4 %
ERYTHROCYTE [DISTWIDTH] IN BLOOD BY AUTOMATED COUNT: 12.4 % (ref 11.7–15.4)
GLOBULIN SER CALC-MCNC: 2.3 G/DL (ref 1.5–4.5)
GLUCOSE SERPL-MCNC: 90 MG/DL (ref 70–99)
HBA1C MFR BLD: 5.8 % (ref 4.8–5.6)
HCT VFR BLD AUTO: 38.8 % (ref 34–46.6)
HDLC SERPL-MCNC: 48 MG/DL
HGB BLD-MCNC: 12.7 G/DL (ref 11.1–15.9)
IMM GRANULOCYTES # BLD AUTO: 0 X10E3/UL (ref 0–0.1)
IMM GRANULOCYTES NFR BLD AUTO: 0 %
LDLC SERPL CALC-MCNC: 106 MG/DL (ref 0–99)
LYMPHOCYTES # BLD AUTO: 2.1 X10E3/UL (ref 0.7–3.1)
LYMPHOCYTES NFR BLD AUTO: 39 %
MCH RBC QN AUTO: 31.5 PG (ref 26.6–33)
MCHC RBC AUTO-ENTMCNC: 32.7 G/DL (ref 31.5–35.7)
MCV RBC AUTO: 96 FL (ref 79–97)
MONOCYTES # BLD AUTO: 0.4 X10E3/UL (ref 0.1–0.9)
MONOCYTES NFR BLD AUTO: 8 %
NEUTROPHILS # BLD AUTO: 2.5 X10E3/UL (ref 1.4–7)
NEUTROPHILS NFR BLD AUTO: 48 %
PLATELET # BLD AUTO: 233 X10E3/UL (ref 150–450)
POTASSIUM SERPL-SCNC: 4.5 MMOL/L (ref 3.5–5.2)
PROT SERPL-MCNC: 6.8 G/DL (ref 6–8.5)
RBC # BLD AUTO: 4.03 X10E6/UL (ref 3.77–5.28)
SODIUM SERPL-SCNC: 142 MMOL/L (ref 134–144)
TRIGL SERPL-MCNC: 177 MG/DL (ref 0–149)
TSH SERPL DL<=0.005 MIU/L-ACNC: 1.36 UIU/ML (ref 0.45–4.5)
VLDLC SERPL CALC-MCNC: 31 MG/DL (ref 5–40)
WBC # BLD AUTO: 5.2 X10E3/UL (ref 3.4–10.8)

## 2025-06-04 ENCOUNTER — RESULTS FOLLOW-UP (OUTPATIENT)
Dept: FAMILY MEDICINE CLINIC | Facility: CLINIC | Age: 55
End: 2025-06-04
Payer: COMMERCIAL

## 2025-06-04 NOTE — TELEPHONE ENCOUNTER
LM on VM to call back.    HUB to relay message     Please let patient know from labs triglycerides high.  LDL which is the bad cholesterol in the body is elevated.  Ideally we would like her LDL less than 100.  At this point we can either increase her atorvastatin or she can try to improve lifestyle and eat a healthier low-fat diet and exercise at least 30 minutes most days of the week.  Let me know what she wants to do.     A1c which is a test for diabetes has improved but still in the prediabetic range at 5.8.  Encourage healthy diet limiting intake of sugar sweets carbs starches and again exercise.  Remaining labs unremarkable.  Thanks

## 2025-07-31 ENCOUNTER — TELEPHONE (OUTPATIENT)
Dept: ORTHOPEDIC SURGERY | Facility: CLINIC | Age: 55
End: 2025-07-31

## 2025-08-05 ENCOUNTER — OFFICE VISIT (OUTPATIENT)
Dept: ORTHOPEDIC SURGERY | Facility: CLINIC | Age: 55
End: 2025-08-05
Payer: COMMERCIAL

## 2025-08-05 DIAGNOSIS — S83.241D ACUTE MEDIAL MENISCUS TEAR OF RIGHT KNEE, SUBSEQUENT ENCOUNTER: Primary | ICD-10-CM

## 2025-08-05 RX ORDER — BUPIVACAINE HYDROCHLORIDE 2.5 MG/ML
4 INJECTION, SOLUTION EPIDURAL; INFILTRATION; INTRACAUDAL; PERINEURAL
Status: COMPLETED | OUTPATIENT
Start: 2025-08-05 | End: 2025-08-05

## 2025-08-05 RX ORDER — DEXAMETHASONE SODIUM PHOSPHATE 4 MG/ML
8 INJECTION, SOLUTION INTRA-ARTICULAR; INTRALESIONAL; INTRAMUSCULAR; INTRAVENOUS; SOFT TISSUE
Status: COMPLETED | OUTPATIENT
Start: 2025-08-05 | End: 2025-08-05

## 2025-08-05 RX ORDER — LIDOCAINE HYDROCHLORIDE 10 MG/ML
4 INJECTION, SOLUTION EPIDURAL; INFILTRATION; INTRACAUDAL; PERINEURAL
Status: COMPLETED | OUTPATIENT
Start: 2025-08-05 | End: 2025-08-05

## 2025-08-05 RX ADMIN — DEXAMETHASONE SODIUM PHOSPHATE 8 MG: 4 INJECTION, SOLUTION INTRA-ARTICULAR; INTRALESIONAL; INTRAMUSCULAR; INTRAVENOUS; SOFT TISSUE at 13:01

## 2025-08-05 RX ADMIN — BUPIVACAINE HYDROCHLORIDE 4 ML: 2.5 INJECTION, SOLUTION EPIDURAL; INFILTRATION; INTRACAUDAL; PERINEURAL at 13:01

## 2025-08-05 RX ADMIN — LIDOCAINE HYDROCHLORIDE 4 ML: 10 INJECTION, SOLUTION EPIDURAL; INFILTRATION; INTRACAUDAL; PERINEURAL at 13:01
